# Patient Record
Sex: MALE | Race: BLACK OR AFRICAN AMERICAN | NOT HISPANIC OR LATINO | Employment: FULL TIME | URBAN - METROPOLITAN AREA
[De-identification: names, ages, dates, MRNs, and addresses within clinical notes are randomized per-mention and may not be internally consistent; named-entity substitution may affect disease eponyms.]

---

## 2017-07-03 ENCOUNTER — DOCUMENTATION ONLY (OUTPATIENT)
Dept: FAMILY MEDICINE | Facility: CLINIC | Age: 37
End: 2017-07-03

## 2017-07-03 ENCOUNTER — LAB VISIT (OUTPATIENT)
Dept: LAB | Facility: HOSPITAL | Age: 37
End: 2017-07-03
Attending: FAMILY MEDICINE
Payer: COMMERCIAL

## 2017-07-03 ENCOUNTER — OFFICE VISIT (OUTPATIENT)
Dept: FAMILY MEDICINE | Facility: CLINIC | Age: 37
End: 2017-07-03
Payer: COMMERCIAL

## 2017-07-03 VITALS
HEART RATE: 58 BPM | TEMPERATURE: 98 F | WEIGHT: 207.69 LBS | HEIGHT: 74 IN | BODY MASS INDEX: 26.66 KG/M2 | DIASTOLIC BLOOD PRESSURE: 71 MMHG | SYSTOLIC BLOOD PRESSURE: 127 MMHG

## 2017-07-03 DIAGNOSIS — Z23 NEED FOR TETANUS BOOSTER: ICD-10-CM

## 2017-07-03 DIAGNOSIS — Z13.220 SCREENING CHOLESTEROL LEVEL: ICD-10-CM

## 2017-07-03 DIAGNOSIS — Z00.00 ANNUAL PHYSICAL EXAM: ICD-10-CM

## 2017-07-03 DIAGNOSIS — Z00.00 ANNUAL PHYSICAL EXAM: Primary | ICD-10-CM

## 2017-07-03 DIAGNOSIS — Z83.3 FAMILY HISTORY OF DIABETES MELLITUS TYPE II: ICD-10-CM

## 2017-07-03 DIAGNOSIS — Z11.3 SCREEN FOR STD (SEXUALLY TRANSMITTED DISEASE): ICD-10-CM

## 2017-07-03 LAB
ALBUMIN SERPL BCP-MCNC: 4.2 G/DL
ALP SERPL-CCNC: 51 U/L
ALT SERPL W/O P-5'-P-CCNC: 22 U/L
ANION GAP SERPL CALC-SCNC: 10 MMOL/L
AST SERPL-CCNC: 21 U/L
BASOPHILS # BLD AUTO: 0.05 K/UL
BASOPHILS NFR BLD: 1.1 %
BILIRUB SERPL-MCNC: 0.4 MG/DL
BUN SERPL-MCNC: 12 MG/DL
CALCIUM SERPL-MCNC: 9.2 MG/DL
CHLORIDE SERPL-SCNC: 102 MMOL/L
CHOLEST/HDLC SERPL: 3.1 {RATIO}
CO2 SERPL-SCNC: 26 MMOL/L
CREAT SERPL-MCNC: 1.1 MG/DL
DIFFERENTIAL METHOD: ABNORMAL
EOSINOPHIL # BLD AUTO: 0.3 K/UL
EOSINOPHIL NFR BLD: 7.3 %
ERYTHROCYTE [DISTWIDTH] IN BLOOD BY AUTOMATED COUNT: 14.1 %
EST. GFR  (AFRICAN AMERICAN): >60 ML/MIN/1.73 M^2
EST. GFR  (NON AFRICAN AMERICAN): >60 ML/MIN/1.73 M^2
ESTIMATED AVG GLUCOSE: 114 MG/DL
GLUCOSE SERPL-MCNC: 95 MG/DL
HBA1C MFR BLD HPLC: 5.6 %
HCT VFR BLD AUTO: 44.4 %
HDL/CHOLESTEROL RATIO: 32.8 %
HDLC SERPL-MCNC: 180 MG/DL
HDLC SERPL-MCNC: 59 MG/DL
HGB BLD-MCNC: 14 G/DL
HIV 1+2 AB+HIV1 P24 AG SERPL QL IA: NEGATIVE
LDLC SERPL CALC-MCNC: 103.2 MG/DL
LYMPHOCYTES # BLD AUTO: 1.6 K/UL
LYMPHOCYTES NFR BLD: 37 %
MCH RBC QN AUTO: 23.5 PG
MCHC RBC AUTO-ENTMCNC: 31.5 %
MCV RBC AUTO: 75 FL
MONOCYTES # BLD AUTO: 0.5 K/UL
MONOCYTES NFR BLD: 12.3 %
NEUTROPHILS # BLD AUTO: 1.9 K/UL
NEUTROPHILS NFR BLD: 42.1 %
NONHDLC SERPL-MCNC: 121 MG/DL
PLATELET # BLD AUTO: 223 K/UL
PMV BLD AUTO: 10.2 FL
POTASSIUM SERPL-SCNC: 4.1 MMOL/L
PROT SERPL-MCNC: 7.3 G/DL
RBC # BLD AUTO: 5.95 M/UL
RPR SER QL: NORMAL
SODIUM SERPL-SCNC: 138 MMOL/L
TRIGL SERPL-MCNC: 89 MG/DL
TSH SERPL DL<=0.005 MIU/L-ACNC: 1.56 UIU/ML
WBC # BLD AUTO: 4.4 K/UL

## 2017-07-03 PROCEDURE — 86592 SYPHILIS TEST NON-TREP QUAL: CPT

## 2017-07-03 PROCEDURE — 99395 PREV VISIT EST AGE 18-39: CPT | Mod: S$GLB,,, | Performed by: PHYSICIAN ASSISTANT

## 2017-07-03 PROCEDURE — 80053 COMPREHEN METABOLIC PANEL: CPT

## 2017-07-03 PROCEDURE — 90471 IMMUNIZATION ADMIN: CPT | Mod: S$GLB,,, | Performed by: FAMILY MEDICINE

## 2017-07-03 PROCEDURE — 36415 COLL VENOUS BLD VENIPUNCTURE: CPT | Mod: PO

## 2017-07-03 PROCEDURE — 85025 COMPLETE CBC W/AUTO DIFF WBC: CPT

## 2017-07-03 PROCEDURE — 87591 N.GONORRHOEAE DNA AMP PROB: CPT

## 2017-07-03 PROCEDURE — 86703 HIV-1/HIV-2 1 RESULT ANTBDY: CPT

## 2017-07-03 PROCEDURE — 80061 LIPID PANEL: CPT

## 2017-07-03 PROCEDURE — 84443 ASSAY THYROID STIM HORMONE: CPT

## 2017-07-03 PROCEDURE — 90715 TDAP VACCINE 7 YRS/> IM: CPT | Mod: S$GLB,,, | Performed by: FAMILY MEDICINE

## 2017-07-03 PROCEDURE — 99999 PR PBB SHADOW E&M-EST. PATIENT-LVL III: CPT | Mod: PBBFAC,,, | Performed by: PHYSICIAN ASSISTANT

## 2017-07-03 PROCEDURE — 83036 HEMOGLOBIN GLYCOSYLATED A1C: CPT

## 2017-07-03 RX ORDER — MELOXICAM 15 MG/1
TABLET ORAL
COMMUNITY
Start: 2017-06-12 | End: 2017-07-03

## 2017-07-03 RX ORDER — CYCLOBENZAPRINE HCL 5 MG
TABLET ORAL
COMMUNITY
Start: 2017-06-13 | End: 2017-07-03

## 2017-07-03 NOTE — PROGRESS NOTES
Pre-Visit Chart Review  For Appointment Scheduled on 07/03/17    Health Maintenance Due   Topic Date Due    TETANUS VACCINE  06/28/1999

## 2017-07-03 NOTE — PROGRESS NOTES
Subjective:       Patient ID: Tre Sommer Sr. is a 37 y.o. male.    Chief Complaint: Annual Exam    HPI   Patient is a 37 year old AA male presenting to the clinic for annual wellness exam. He is without any complaints at this time. He reports recent episode of neck pain & had ct neck performed outside of our system. He reports pain has resolved. He would like to be screened for STDs as well.   Review of Systems   Constitutional: Negative for activity change, appetite change, chills, fatigue and fever.   HENT: Negative for congestion, ear pain, postnasal drip, rhinorrhea and sinus pressure.    Respiratory: Negative for cough, shortness of breath and wheezing.    Cardiovascular: Negative for chest pain and palpitations.   Gastrointestinal: Negative for abdominal pain, constipation, diarrhea, nausea and vomiting.   Genitourinary: Negative for frequency, hematuria and urgency.   Musculoskeletal: Negative for back pain and gait problem.   Skin: Negative for rash.   Neurological: Negative for syncope, weakness and headaches.   Psychiatric/Behavioral: Negative for agitation and confusion.       Objective:      Physical Exam   Constitutional: Vital signs are normal. He appears well-developed and well-nourished. No distress.   Cardiovascular: Normal rate, regular rhythm, S1 normal, S2 normal and normal heart sounds.  Exam reveals no gallop.    No murmur heard.  Pulses:       Radial pulses are 2+ on the right side, and 2+ on the left side.   <2sec cap refill fingers bilat     Pulmonary/Chest: Effort normal and breath sounds normal. No respiratory distress. He has no wheezes. He has no rhonchi.   Skin: Skin is warm and dry. He is not diaphoretic.   Appropriate skin turgor   Psychiatric: He has a normal mood and affect. His speech is normal and behavior is normal. Judgment and thought content normal. Cognition and memory are normal.       Assessment:       1. Annual physical exam    2. Family history of diabetes mellitus  type II    3. Screen for STD (sexually transmitted disease)    4. Need for tetanus booster    5. Screening cholesterol level        Plan:   Tre was seen today for annual exam.    Diagnoses and all orders for this visit:    Annual physical exam  -     CBC auto differential; Future  -     Comprehensive metabolic panel; Future  -     TSH; Future    Family history of diabetes mellitus type II  -     Comprehensive metabolic panel; Future  -     Hemoglobin A1c; Future    Screen for STD (sexually transmitted disease)  -     HIV-1 and HIV-2 antibodies; Future  -     RPR; Future  -     C. trachomatis/N. gonorrhoeae by AMP DNA Urine    Need for tetanus booster  -     (In Office Administered) Tdap Vaccine    Screening cholesterol level  -     Lipid panel; Future

## 2017-07-04 LAB
C TRACH DNA SPEC QL NAA+PROBE: NOT DETECTED
N GONORRHOEA DNA SPEC QL NAA+PROBE: NOT DETECTED

## 2017-07-09 ENCOUNTER — PATIENT MESSAGE (OUTPATIENT)
Dept: FAMILY MEDICINE | Facility: CLINIC | Age: 37
End: 2017-07-09

## 2018-11-12 ENCOUNTER — OFFICE VISIT (OUTPATIENT)
Dept: FAMILY MEDICINE | Facility: CLINIC | Age: 38
End: 2018-11-12
Payer: COMMERCIAL

## 2018-11-12 VITALS
DIASTOLIC BLOOD PRESSURE: 65 MMHG | SYSTOLIC BLOOD PRESSURE: 110 MMHG | HEIGHT: 74 IN | TEMPERATURE: 98 F | WEIGHT: 214.31 LBS | BODY MASS INDEX: 27.5 KG/M2 | HEART RATE: 70 BPM

## 2018-11-12 DIAGNOSIS — Z13.220 SCREENING CHOLESTEROL LEVEL: ICD-10-CM

## 2018-11-12 DIAGNOSIS — Z23 NEEDS FLU SHOT: ICD-10-CM

## 2018-11-12 DIAGNOSIS — Z11.3 SCREEN FOR STD (SEXUALLY TRANSMITTED DISEASE): ICD-10-CM

## 2018-11-12 DIAGNOSIS — Z00.00 ANNUAL PHYSICAL EXAM: Primary | ICD-10-CM

## 2018-11-12 PROCEDURE — 90471 IMMUNIZATION ADMIN: CPT | Mod: S$GLB,,, | Performed by: PHYSICIAN ASSISTANT

## 2018-11-12 PROCEDURE — 99999 PR PBB SHADOW E&M-EST. PATIENT-LVL III: CPT | Mod: PBBFAC,,, | Performed by: PHYSICIAN ASSISTANT

## 2018-11-12 PROCEDURE — 90686 IIV4 VACC NO PRSV 0.5 ML IM: CPT | Mod: S$GLB,,, | Performed by: PHYSICIAN ASSISTANT

## 2018-11-12 PROCEDURE — 99395 PREV VISIT EST AGE 18-39: CPT | Mod: 25,S$GLB,, | Performed by: PHYSICIAN ASSISTANT

## 2018-11-12 NOTE — PROGRESS NOTES
Subjective:       Patient ID: Tre Sommer Sr. is a 38 y.o. male.    Chief Complaint: Annual Exam    HPI   Patient is a 38 year old AA male presenting to the clinic for annual physical exam. Patient is doing well at this time. He denies any recent hospitalizations or new diagnosis. He would like to return for fasting labs & get screened for STDs. He denies any current symptoms. Patient works out at the gym frequently & has been feeling well. He agrees to get his flu vaccination today.  Review of Systems   Constitutional: Negative for activity change, appetite change, chills, fatigue and fever.   HENT: Negative for congestion, ear pain, postnasal drip, rhinorrhea and sinus pressure.    Respiratory: Negative for cough, shortness of breath and wheezing.    Cardiovascular: Negative for chest pain and palpitations.   Gastrointestinal: Negative for abdominal pain, constipation, diarrhea, nausea and vomiting.   Genitourinary: Negative for frequency, hematuria and urgency.   Musculoskeletal: Negative for back pain and gait problem.   Skin: Negative for rash.   Neurological: Negative for syncope, weakness and headaches.   Psychiatric/Behavioral: Negative for agitation and confusion.       Objective:      Physical Exam   Constitutional: Vital signs are normal. He appears well-developed and well-nourished. No distress.   Cardiovascular: Normal rate, regular rhythm, S1 normal, S2 normal and normal heart sounds. Exam reveals no gallop.   No murmur heard.  Pulses:       Radial pulses are 2+ on the right side, and 2+ on the left side.   <2sec cap refill fingers bilat     Pulmonary/Chest: Effort normal and breath sounds normal. No respiratory distress. He has no wheezes. He has no rhonchi.   Abdominal: Soft. Normal appearance and bowel sounds are normal. There is no hepatosplenomegaly. There is no tenderness. There is no rigidity, no guarding, no tenderness at McBurney's point and negative Benson's sign.   Skin: Skin is warm and  dry. He is not diaphoretic.   Appropriate skin turgor   Psychiatric: He has a normal mood and affect. His speech is normal and behavior is normal. Judgment and thought content normal. Cognition and memory are normal.       Assessment:       1. Annual physical exam    2. Screen for STD (sexually transmitted disease)    3. Screening cholesterol level    4. Needs flu shot        Plan:       Tre was seen today for annual exam.    Diagnoses and all orders for this visit:    Annual physical exam  -     CBC auto differential; Future  -     Comprehensive metabolic panel; Future  -     Lipid panel; Future  -     TSH; Future    Screen for STD (sexually transmitted disease)  -     RPR; Future  -     HIV-1 and HIV-2 antibodies; Future  -     C. trachomatis/N. gonorrhoeae by AMP DNA [facility]; Urine    Screening cholesterol level  -     Lipid panel; Future    Needs flu shot  -     Influenza - Quadrivalent (3 years & older) (PF)

## 2018-11-17 ENCOUNTER — LAB VISIT (OUTPATIENT)
Dept: LAB | Facility: HOSPITAL | Age: 38
End: 2018-11-17
Attending: PHYSICIAN ASSISTANT
Payer: COMMERCIAL

## 2018-11-17 DIAGNOSIS — Z11.3 SCREEN FOR STD (SEXUALLY TRANSMITTED DISEASE): ICD-10-CM

## 2018-11-17 DIAGNOSIS — Z13.220 SCREENING CHOLESTEROL LEVEL: ICD-10-CM

## 2018-11-17 DIAGNOSIS — Z00.00 ANNUAL PHYSICAL EXAM: ICD-10-CM

## 2018-11-17 LAB
ALBUMIN SERPL BCP-MCNC: 4 G/DL
ALP SERPL-CCNC: 57 U/L
ALT SERPL W/O P-5'-P-CCNC: 23 U/L
ANION GAP SERPL CALC-SCNC: 7 MMOL/L
AST SERPL-CCNC: 24 U/L
BASOPHILS # BLD AUTO: 0.07 K/UL
BASOPHILS NFR BLD: 1.7 %
BILIRUB SERPL-MCNC: 0.7 MG/DL
BUN SERPL-MCNC: 11 MG/DL
CALCIUM SERPL-MCNC: 9.1 MG/DL
CHLORIDE SERPL-SCNC: 104 MMOL/L
CHOLEST SERPL-MCNC: 190 MG/DL
CHOLEST/HDLC SERPL: 2.5 {RATIO}
CO2 SERPL-SCNC: 28 MMOL/L
CREAT SERPL-MCNC: 1.1 MG/DL
DIFFERENTIAL METHOD: ABNORMAL
EOSINOPHIL # BLD AUTO: 0.2 K/UL
EOSINOPHIL NFR BLD: 5.2 %
ERYTHROCYTE [DISTWIDTH] IN BLOOD BY AUTOMATED COUNT: 15.1 %
EST. GFR  (AFRICAN AMERICAN): >60 ML/MIN/1.73 M^2
EST. GFR  (NON AFRICAN AMERICAN): >60 ML/MIN/1.73 M^2
GLUCOSE SERPL-MCNC: 97 MG/DL
HCT VFR BLD AUTO: 45.9 %
HDLC SERPL-MCNC: 77 MG/DL
HDLC SERPL: 40.5 %
HGB BLD-MCNC: 13.9 G/DL
IMM GRANULOCYTES # BLD AUTO: 0.01 K/UL
IMM GRANULOCYTES NFR BLD AUTO: 0.2 %
LDLC SERPL CALC-MCNC: 99 MG/DL
LYMPHOCYTES # BLD AUTO: 1.6 K/UL
LYMPHOCYTES NFR BLD: 38.7 %
MCH RBC QN AUTO: 23.7 PG
MCHC RBC AUTO-ENTMCNC: 30.3 G/DL
MCV RBC AUTO: 78 FL
MONOCYTES # BLD AUTO: 0.5 K/UL
MONOCYTES NFR BLD: 12.2 %
NEUTROPHILS # BLD AUTO: 1.7 K/UL
NEUTROPHILS NFR BLD: 42 %
NONHDLC SERPL-MCNC: 113 MG/DL
NRBC BLD-RTO: 0 /100 WBC
PLATELET # BLD AUTO: 302 K/UL
PMV BLD AUTO: 10.6 FL
POTASSIUM SERPL-SCNC: 4.3 MMOL/L
PROT SERPL-MCNC: 7.1 G/DL
RBC # BLD AUTO: 5.86 M/UL
SODIUM SERPL-SCNC: 139 MMOL/L
TRIGL SERPL-MCNC: 70 MG/DL
TSH SERPL DL<=0.005 MIU/L-ACNC: 1 UIU/ML
WBC # BLD AUTO: 4.03 K/UL

## 2018-11-17 PROCEDURE — 80053 COMPREHEN METABOLIC PANEL: CPT

## 2018-11-17 PROCEDURE — 80061 LIPID PANEL: CPT

## 2018-11-17 PROCEDURE — 84443 ASSAY THYROID STIM HORMONE: CPT

## 2018-11-17 PROCEDURE — 36415 COLL VENOUS BLD VENIPUNCTURE: CPT | Mod: PO

## 2018-11-17 PROCEDURE — 86592 SYPHILIS TEST NON-TREP QUAL: CPT

## 2018-11-17 PROCEDURE — 85025 COMPLETE CBC W/AUTO DIFF WBC: CPT

## 2018-11-17 PROCEDURE — 86703 HIV-1/HIV-2 1 RESULT ANTBDY: CPT

## 2018-11-19 LAB
HIV 1+2 AB+HIV1 P24 AG SERPL QL IA: NEGATIVE
RPR SER QL: NORMAL

## 2019-09-08 ENCOUNTER — OFFICE VISIT (OUTPATIENT)
Dept: URGENT CARE | Facility: CLINIC | Age: 39
End: 2019-09-08
Payer: COMMERCIAL

## 2019-09-08 VITALS
RESPIRATION RATE: 18 BRPM | TEMPERATURE: 98 F | WEIGHT: 208 LBS | BODY MASS INDEX: 26.69 KG/M2 | HEART RATE: 58 BPM | DIASTOLIC BLOOD PRESSURE: 71 MMHG | HEIGHT: 74 IN | OXYGEN SATURATION: 98 % | SYSTOLIC BLOOD PRESSURE: 118 MMHG

## 2019-09-08 DIAGNOSIS — J06.9 VIRAL URI: Primary | ICD-10-CM

## 2019-09-08 LAB
CTP QC/QA: YES
S PYO RRNA THROAT QL PROBE: NEGATIVE

## 2019-09-08 PROCEDURE — 87880 STREP A ASSAY W/OPTIC: CPT | Mod: QW,S$GLB,, | Performed by: FAMILY MEDICINE

## 2019-09-08 PROCEDURE — 3008F PR BODY MASS INDEX (BMI) DOCUMENTED: ICD-10-PCS | Mod: CPTII,S$GLB,, | Performed by: FAMILY MEDICINE

## 2019-09-08 PROCEDURE — 99214 PR OFFICE/OUTPT VISIT, EST, LEVL IV, 30-39 MIN: ICD-10-PCS | Mod: S$GLB,,, | Performed by: FAMILY MEDICINE

## 2019-09-08 PROCEDURE — 87880 POCT RAPID STREP A: ICD-10-PCS | Mod: QW,S$GLB,, | Performed by: FAMILY MEDICINE

## 2019-09-08 PROCEDURE — 3008F BODY MASS INDEX DOCD: CPT | Mod: CPTII,S$GLB,, | Performed by: FAMILY MEDICINE

## 2019-09-08 PROCEDURE — 99214 OFFICE O/P EST MOD 30 MIN: CPT | Mod: S$GLB,,, | Performed by: FAMILY MEDICINE

## 2019-09-08 RX ORDER — FLUTICASONE PROPIONATE 50 MCG
1 SPRAY, SUSPENSION (ML) NASAL 2 TIMES DAILY
Qty: 1 BOTTLE | Refills: 0 | Status: SHIPPED | OUTPATIENT
Start: 2019-09-08 | End: 2021-02-09

## 2019-09-08 NOTE — PATIENT INSTRUCTIONS
PLEASE READ YOUR DISCHARGE INSTRUCTIONS ENTIRELY AS IT CONTAINS IMPORTANT INFORMATION.      Please drink plenty of fluids.    Please get plenty of rest.    Please return here or go to the Emergency Department for any concerns or worsening of condition.    Continue claritin D    If not allergic, please take over the counter Tylenol (Acetaminophen) and/or Motrin (Ibuprofen) as directed for control of pain and/or fever.  Please follow up with your primary care doctor or specialist as needed.    Sore throat recommendations: Warm fluids, warm salt water gargles, throat lozenges, tea, honey, soup, rest, hydration.    Use over the counter flonase: one spray each nostril twice daily OR two sprays each nostril once daily.     If you  smoke, please stop smoking.      Please return or see your primary care doctor if you develop new or worsening symptoms.     Please arrange follow up with your primary medical clinic as soon as possible. You must understand that you've received an Urgent Care treatment only and that you may be released before all of your medical problems are known or treated. You, the patient, will arrange for follow up as instructed. If your symptoms worsen or fail to improve you should go to the Emergency Room.    Viral Upper Respiratory Illness (Adult)  You have a viral upper respiratory illness (URI), which is another term for the common cold. This illness is contagious during the first few days. It is spread through the air by coughing and sneezing. It may also be spread by direct contact (touching the sick person and then touching your own eyes, nose, or mouth). Frequent handwashing will decrease risk of spread. Most viral illnesses go away within 7 to 10 days with rest and simple home remedies. Sometimes the illness may last for several weeks. Antibiotics will not kill a virus, and they are generally not prescribed for this condition.    Home care  · If symptoms are severe, rest at home for the first 2 to  3 days. When you resume activity, don't let yourself get too tired.  · Avoid being exposed to cigarette smoke (yours or others).  · You may use acetaminophen or ibuprofen to control pain and fever, unless another medicine was prescribed. (Note: If you have chronic liver or kidney disease, have ever had a stomach ulcer or gastrointestinal bleeding, or are taking blood-thinning medicines, talk with your healthcare provider before using these medicines.) Aspirin should never be given to anyone under 18 years of age who is ill with a viral infection or fever. It may cause severe liver or brain damage.  · Your appetite may be poor, so a light diet is fine. Avoid dehydration by drinking 6 to 8 glasses of fluids per day (water, soft drinks, juices, tea, or soup). Extra fluids will help loosen secretions in the nose and lungs.  · Over-the-counter cold medicines will not shorten the length of time youre sick, but they may be helpful for the following symptoms: cough, sore throat, and nasal and sinus congestion. (Note: Do not use decongestants if you have high blood pressure.)  Follow-up care  Follow up with your healthcare provider, or as advised.  When to seek medical advice  Call your healthcare provider right away if any of these occur:  · Cough with lots of colored sputum (mucus)  · Severe headache; face, neck, or ear pain  · Difficulty swallowing due to throat pain  · Fever of 100.4°F (38°C)  Call 911, or get immediate medical care  Call emergency services right away if any of these occur:  · Chest pain, shortness of breath, wheezing, or difficulty breathing  · Coughing up blood  · Inability to swallow due to throat pain  Date Last Reviewed: 9/13/2015  © 7677-1628 [employer]. [address] [place] [address], [place] [address]. All rights reserved. This information is not intended as a substitute for professional medical care. Always follow your healthcare professional's instructions.

## 2019-09-08 NOTE — PROGRESS NOTES
"Subjective:       Patient ID: Tre Sommer Sr. is a 39 y.o. male.    Vitals:  height is 6' 2" (1.88 m) and weight is 94.3 kg (208 lb). His temperature is 97.7 °F (36.5 °C). His blood pressure is 118/71 and his pulse is 58 (abnormal). His respiration is 18 and oxygen saturation is 98%.     Chief Complaint: Sore Throat and Fever    Sore throat started a week ago sometimes cough.  Temperature highest 99.  Symptoms not worsening.  Took Claritin-D once    Sore Throat    This is a new problem. The current episode started 1 to 4 weeks ago. The problem has been unchanged. The maximum temperature recorded prior to his arrival was 100.4 - 100.9 F. Associated symptoms include coughing (infrequent) and ear pain. Pertinent negatives include no congestion, shortness of breath, stridor or vomiting. He has tried NSAIDs (claritin d, motrin) for the symptoms. The treatment provided no relief.   Fever    Associated symptoms include coughing (infrequent), ear pain, nausea and a sore throat. Pertinent negatives include no congestion, rash, vomiting or wheezing.       Constitution: Negative for chills, sweating, fatigue and fever.   HENT: Positive for ear pain and sore throat. Negative for congestion, sinus pain, sinus pressure and voice change.    Neck: Negative for painful lymph nodes.   Eyes: Negative for eye redness.   Respiratory: Positive for cough (infrequent). Negative for chest tightness, sputum production, bloody sputum, COPD, shortness of breath, stridor, wheezing and asthma.    Gastrointestinal: Positive for nausea. Negative for vomiting.   Musculoskeletal: Negative for muscle ache.   Skin: Negative for rash.   Allergic/Immunologic: Negative for seasonal allergies and asthma.   Hematologic/Lymphatic: Negative for swollen lymph nodes.       Objective:      Physical Exam   Constitutional: He is oriented to person, place, and time. He appears well-developed and well-nourished. He is cooperative.  Non-toxic appearance. He does " not appear ill. No distress.   HENT:   Head: Normocephalic and atraumatic.   Right Ear: Hearing, tympanic membrane, external ear and ear canal normal. Tympanic membrane is not erythematous. No middle ear effusion.   Left Ear: Hearing, tympanic membrane, external ear and ear canal normal. Tympanic membrane is not erythematous.  No middle ear effusion.   Nose: Mucosal edema present. No rhinorrhea or nasal deformity. No epistaxis. Right sinus exhibits no maxillary sinus tenderness and no frontal sinus tenderness. Left sinus exhibits no maxillary sinus tenderness and no frontal sinus tenderness.   Mouth/Throat: Uvula is midline, oropharynx is clear and moist and mucous membranes are normal. No trismus in the jaw. Normal dentition. No uvula swelling. No posterior oropharyngeal erythema.   Cobblestoning   Eyes: Conjunctivae and lids are normal. No scleral icterus.   Sclera clear bilat   Neck: Trachea normal, full passive range of motion without pain and phonation normal. Neck supple.   Cardiovascular: Normal rate, regular rhythm, normal heart sounds, intact distal pulses and normal pulses.   Pulmonary/Chest: Effort normal and breath sounds normal. No respiratory distress. He has no wheezes.   Abdominal: Soft. Normal appearance and bowel sounds are normal. He exhibits no distension. There is no tenderness.   Musculoskeletal: Normal range of motion. He exhibits no edema or deformity.   Lymphadenopathy:     He has no cervical adenopathy.   Neurological: He is alert and oriented to person, place, and time. He exhibits normal muscle tone. Coordination normal.   Skin: Skin is warm, dry and intact. He is not diaphoretic. No pallor.   Psychiatric: He has a normal mood and affect. His speech is normal and behavior is normal. Judgment and thought content normal. Cognition and memory are normal.   Nursing note and vitals reviewed.      Results for orders placed or performed in visit on 09/08/19   POCT rapid strep A   Result Value  Ref Range    Rapid Strep A Screen Negative Negative     Acceptable Yes        Assessment:       1. Viral URI        Plan:         Viral URI  -     POCT rapid strep A  -     fluticasone propionate (FLONASE) 50 mcg/actuation nasal spray; 1 spray (50 mcg total) by Each Nostril route 2 (two) times daily.  Dispense: 1 Bottle; Refill: 0      Patient Instructions   PLEASE READ YOUR DISCHARGE INSTRUCTIONS ENTIRELY AS IT CONTAINS IMPORTANT INFORMATION.      Please drink plenty of fluids.    Please get plenty of rest.    Please return here or go to the Emergency Department for any concerns or worsening of condition.    Continue claritin D    If not allergic, please take over the counter Tylenol (Acetaminophen) and/or Motrin (Ibuprofen) as directed for control of pain and/or fever.  Please follow up with your primary care doctor or specialist as needed.    Sore throat recommendations: Warm fluids, warm salt water gargles, throat lozenges, tea, honey, soup, rest, hydration.    Use over the counter flonase: one spray each nostril twice daily OR two sprays each nostril once daily.     If you  smoke, please stop smoking.      Please return or see your primary care doctor if you develop new or worsening symptoms.     Please arrange follow up with your primary medical clinic as soon as possible. You must understand that you've received an Urgent Care treatment only and that you may be released before all of your medical problems are known or treated. You, the patient, will arrange for follow up as instructed. If your symptoms worsen or fail to improve you should go to the Emergency Room.    Viral Upper Respiratory Illness (Adult)  You have a viral upper respiratory illness (URI), which is another term for the common cold. This illness is contagious during the first few days. It is spread through the air by coughing and sneezing. It may also be spread by direct contact (touching the sick person and then touching your  own eyes, nose, or mouth). Frequent handwashing will decrease risk of spread. Most viral illnesses go away within 7 to 10 days with rest and simple home remedies. Sometimes the illness may last for several weeks. Antibiotics will not kill a virus, and they are generally not prescribed for this condition.    Home care  · If symptoms are severe, rest at home for the first 2 to 3 days. When you resume activity, don't let yourself get too tired.  · Avoid being exposed to cigarette smoke (yours or others).  · You may use acetaminophen or ibuprofen to control pain and fever, unless another medicine was prescribed. (Note: If you have chronic liver or kidney disease, have ever had a stomach ulcer or gastrointestinal bleeding, or are taking blood-thinning medicines, talk with your healthcare provider before using these medicines.) Aspirin should never be given to anyone under 18 years of age who is ill with a viral infection or fever. It may cause severe liver or brain damage.  · Your appetite may be poor, so a light diet is fine. Avoid dehydration by drinking 6 to 8 glasses of fluids per day (water, soft drinks, juices, tea, or soup). Extra fluids will help loosen secretions in the nose and lungs.  · Over-the-counter cold medicines will not shorten the length of time youre sick, but they may be helpful for the following symptoms: cough, sore throat, and nasal and sinus congestion. (Note: Do not use decongestants if you have high blood pressure.)  Follow-up care  Follow up with your healthcare provider, or as advised.  When to seek medical advice  Call your healthcare provider right away if any of these occur:  · Cough with lots of colored sputum (mucus)  · Severe headache; face, neck, or ear pain  · Difficulty swallowing due to throat pain  · Fever of 100.4°F (38°C)  Call 911, or get immediate medical care  Call emergency services right away if any of these occur:  · Chest pain, shortness of breath, wheezing, or  difficulty breathing  · Coughing up blood  · Inability to swallow due to throat pain  Date Last Reviewed: 9/13/2015  © 9299-8064 [employer]. [address] [place] [address], [place] [address]. All rights reserved. This information is not intended as a substitute for professional medical care. Always follow your healthcare professional's instructions.

## 2019-10-28 ENCOUNTER — PATIENT OUTREACH (OUTPATIENT)
Dept: ADMINISTRATIVE | Facility: HOSPITAL | Age: 39
End: 2019-10-28

## 2019-11-10 NOTE — PROGRESS NOTES
"Subjective:     @Patient ID: Tre Sommer Sr. is a 39 y.o. male.    Chief Complaint: Establish Care and Annual Exam    HPI    39 y.o. male here for annual exam. Pt is new to me. Is  with 2 kids. Works for [employer]. Works out regularly. Reports he is well.     Lipid disorders/ASCVD risk (ages >/= 45 or >/= 20 if increased risk ): ordered  DM (>45y yearly or if obese, HTN): A1c ordered  Eye exam: n/a      Vaccines:   Influenza (yearly) due   Tetanus (every 10 yrs - 1st tdap) done 2017    Exercise: 5-6x/week crossfit and jogging 2-3x a week   Diet: Regular         Review of Systems   Constitutional: Negative for activity change and unexpected weight change.   HENT: Negative for hearing loss, rhinorrhea and trouble swallowing.    Eyes: Negative for discharge and visual disturbance.   Respiratory: Negative for chest tightness and wheezing.    Cardiovascular: Negative for chest pain and palpitations.   Gastrointestinal: Negative for blood in stool, constipation, diarrhea and vomiting.   Endocrine: Negative for polydipsia and polyuria.   Genitourinary: Negative for difficulty urinating, hematuria and urgency.   Musculoskeletal: Negative for arthralgias, joint swelling and neck pain.   Neurological: Negative for weakness and headaches.   Psychiatric/Behavioral: Negative for confusion and dysphoric mood.     Past medical history, surgical history, and family medical history reviewed and updated as appropriate.    Medications and allergies reviewed.     Objective:     Vitals:    11/11/19 0805   BP: 96/64   BP Location: Right arm   Patient Position: Sitting   BP Method: Large (Manual)   Pulse: 60   Resp: 14   Temp: 99.1 °F (37.3 °C)   TempSrc: Oral   Weight: 95.1 kg (209 lb 10.5 oz)   Height: 6' 2.5" (1.892 m)     Body mass index is 26.56 kg/m².  Physical Exam   Constitutional: He is oriented to person, place, and time. He appears well-developed. No distress.   HENT:   Head: Normocephalic and atraumatic. "   Right Ear: External ear normal.   Left Ear: External ear normal.   Mouth/Throat: Oropharynx is clear and moist. No oropharyngeal exudate.   Eyes: Conjunctivae are normal. Right eye exhibits no discharge. Left eye exhibits no discharge.   Neck: Normal range of motion. Neck supple.   Cardiovascular: Normal rate, regular rhythm and intact distal pulses. Exam reveals no friction rub.   No murmur heard.  Pulmonary/Chest: Effort normal and breath sounds normal.   Abdominal: Soft. Bowel sounds are normal. He exhibits no distension. There is no tenderness.   Musculoskeletal: Normal range of motion. He exhibits no edema.   Lymphadenopathy:     He has no cervical adenopathy.   Neurological: He is alert and oriented to person, place, and time.   Skin: Skin is warm and dry.   Psychiatric: He has a normal mood and affect. His behavior is normal.   Vitals reviewed.      Lab Results   Component Value Date    WBC 4.03 11/17/2018    HGB 13.9 (L) 11/17/2018    HCT 45.9 11/17/2018     11/17/2018    CHOL 190 11/17/2018    TRIG 70 11/17/2018    HDL 77 (H) 11/17/2018    ALT 23 11/17/2018    AST 24 11/17/2018     11/17/2018    K 4.3 11/17/2018     11/17/2018    CREATININE 1.1 11/17/2018    BUN 11 11/17/2018    CO2 28 11/17/2018    TSH 0.997 11/17/2018    HGBA1C 5.6 07/03/2017       Assessment:     1. Annual physical exam    2. Screen for STD (sexually transmitted disease)      Plan:   Tre was seen today for establish care and annual exam.    Diagnoses and all orders for this visit:    Annual physical exam  -     Comprehensive metabolic panel; Future  -     CBC auto differential; Future  -     TSH; Future  -     Vitamin D; Future  -     Lipid panel; Future  -     Urinalysis; Future  -     HEMOGLOBIN A1C; Future  -     RPR; Future  -     C. trachomatis/N. gonorrhoeae by AMP DNA [facility]; Urine; Future  -     HIV 1/2 Ag/Ab (4th Gen); Future  -     Hepatitis panel, acute; Future  -     Testosterone; Future    Screen for  STD (sexually transmitted disease)  -     RPR; Future  -     C. trachomatis/N. gonorrhoeae by AMP DNA [facility]; Urine; Future  -     HIV 1/2 Ag/Ab (4th Gen); Future  -     Hepatitis panel, acute; Future        Follow up in about 1 year (around 11/11/2020), or if symptoms worsen or fail to improve.    Kylie Henry MD  Internal Medicine    11/10/2019

## 2019-11-11 ENCOUNTER — OFFICE VISIT (OUTPATIENT)
Dept: INTERNAL MEDICINE | Facility: CLINIC | Age: 39
End: 2019-11-11
Payer: COMMERCIAL

## 2019-11-11 ENCOUNTER — TELEPHONE (OUTPATIENT)
Dept: INTERNAL MEDICINE | Facility: CLINIC | Age: 39
End: 2019-11-11

## 2019-11-11 ENCOUNTER — LAB VISIT (OUTPATIENT)
Dept: LAB | Facility: HOSPITAL | Age: 39
End: 2019-11-11
Attending: HOSPITALIST
Payer: COMMERCIAL

## 2019-11-11 VITALS
SYSTOLIC BLOOD PRESSURE: 96 MMHG | WEIGHT: 209.69 LBS | HEIGHT: 75 IN | HEART RATE: 60 BPM | BODY MASS INDEX: 26.07 KG/M2 | RESPIRATION RATE: 14 BRPM | DIASTOLIC BLOOD PRESSURE: 64 MMHG | TEMPERATURE: 99 F

## 2019-11-11 DIAGNOSIS — D50.9 MICROCYTIC ANEMIA: Primary | ICD-10-CM

## 2019-11-11 DIAGNOSIS — Z11.3 SCREEN FOR STD (SEXUALLY TRANSMITTED DISEASE): ICD-10-CM

## 2019-11-11 DIAGNOSIS — Z00.00 ANNUAL PHYSICAL EXAM: Primary | ICD-10-CM

## 2019-11-11 DIAGNOSIS — Z00.00 ANNUAL PHYSICAL EXAM: ICD-10-CM

## 2019-11-11 LAB
25(OH)D3+25(OH)D2 SERPL-MCNC: 24 NG/ML (ref 30–96)
ALBUMIN SERPL BCP-MCNC: 4.3 G/DL (ref 3.5–5.2)
ALP SERPL-CCNC: 47 U/L (ref 55–135)
ALT SERPL W/O P-5'-P-CCNC: 20 U/L (ref 10–44)
ANION GAP SERPL CALC-SCNC: 9 MMOL/L (ref 8–16)
AST SERPL-CCNC: 30 U/L (ref 10–40)
BASOPHILS # BLD AUTO: 0.07 K/UL (ref 0–0.2)
BASOPHILS NFR BLD: 1.4 % (ref 0–1.9)
BILIRUB SERPL-MCNC: 0.6 MG/DL (ref 0.1–1)
BUN SERPL-MCNC: 10 MG/DL (ref 6–20)
CALCIUM SERPL-MCNC: 9.7 MG/DL (ref 8.7–10.5)
CHLORIDE SERPL-SCNC: 107 MMOL/L (ref 95–110)
CHOLEST SERPL-MCNC: 197 MG/DL (ref 120–199)
CHOLEST/HDLC SERPL: 2.6 {RATIO} (ref 2–5)
CO2 SERPL-SCNC: 25 MMOL/L (ref 23–29)
CREAT SERPL-MCNC: 1.1 MG/DL (ref 0.5–1.4)
DIFFERENTIAL METHOD: ABNORMAL
EOSINOPHIL # BLD AUTO: 0.1 K/UL (ref 0–0.5)
EOSINOPHIL NFR BLD: 2 % (ref 0–8)
ERYTHROCYTE [DISTWIDTH] IN BLOOD BY AUTOMATED COUNT: 14.5 % (ref 11.5–14.5)
EST. GFR  (AFRICAN AMERICAN): >60 ML/MIN/1.73 M^2
EST. GFR  (NON AFRICAN AMERICAN): >60 ML/MIN/1.73 M^2
ESTIMATED AVG GLUCOSE: 108 MG/DL (ref 68–131)
FERRITIN SERPL-MCNC: 94 NG/ML (ref 20–300)
GLUCOSE SERPL-MCNC: 85 MG/DL (ref 70–110)
HAV IGM SERPL QL IA: NEGATIVE
HBA1C MFR BLD HPLC: 5.4 % (ref 4–5.6)
HBV CORE IGM SERPL QL IA: NEGATIVE
HBV SURFACE AG SERPL QL IA: NEGATIVE
HCT VFR BLD AUTO: 43.4 % (ref 40–54)
HCV AB SERPL QL IA: NEGATIVE
HDLC SERPL-MCNC: 76 MG/DL (ref 40–75)
HDLC SERPL: 38.6 % (ref 20–50)
HGB BLD-MCNC: 13 G/DL (ref 14–18)
HIV 1+2 AB+HIV1 P24 AG SERPL QL IA: NEGATIVE
IMM GRANULOCYTES # BLD AUTO: 0.01 K/UL (ref 0–0.04)
IMM GRANULOCYTES NFR BLD AUTO: 0.2 % (ref 0–0.5)
IRON SERPL-MCNC: 83 UG/DL (ref 45–160)
LDLC SERPL CALC-MCNC: 106 MG/DL (ref 63–159)
LYMPHOCYTES # BLD AUTO: 1.4 K/UL (ref 1–4.8)
LYMPHOCYTES NFR BLD: 28 % (ref 18–48)
MCH RBC QN AUTO: 23.5 PG (ref 27–31)
MCHC RBC AUTO-ENTMCNC: 30 G/DL (ref 32–36)
MCV RBC AUTO: 79 FL (ref 82–98)
MONOCYTES # BLD AUTO: 0.5 K/UL (ref 0.3–1)
MONOCYTES NFR BLD: 10.7 % (ref 4–15)
NEUTROPHILS # BLD AUTO: 2.9 K/UL (ref 1.8–7.7)
NEUTROPHILS NFR BLD: 57.7 % (ref 38–73)
NONHDLC SERPL-MCNC: 121 MG/DL
NRBC BLD-RTO: 0 /100 WBC
PLATELET # BLD AUTO: 262 K/UL (ref 150–350)
PMV BLD AUTO: 9.9 FL (ref 9.2–12.9)
POTASSIUM SERPL-SCNC: 4.7 MMOL/L (ref 3.5–5.1)
PROT SERPL-MCNC: 7.3 G/DL (ref 6–8.4)
RBC # BLD AUTO: 5.53 M/UL (ref 4.6–6.2)
SATURATED IRON: 22 % (ref 20–50)
SODIUM SERPL-SCNC: 141 MMOL/L (ref 136–145)
TESTOST SERPL-MCNC: 685 NG/DL (ref 304–1227)
TOTAL IRON BINDING CAPACITY: 386 UG/DL (ref 250–450)
TRANSFERRIN SERPL-MCNC: 261 MG/DL (ref 200–375)
TRIGL SERPL-MCNC: 75 MG/DL (ref 30–150)
TSH SERPL DL<=0.005 MIU/L-ACNC: 1.44 UIU/ML (ref 0.4–4)
WBC # BLD AUTO: 5.07 K/UL (ref 3.9–12.7)

## 2019-11-11 PROCEDURE — 83036 HEMOGLOBIN GLYCOSYLATED A1C: CPT

## 2019-11-11 PROCEDURE — 36415 COLL VENOUS BLD VENIPUNCTURE: CPT | Mod: PO

## 2019-11-11 PROCEDURE — 90471 IMMUNIZATION ADMIN: CPT | Mod: S$GLB,,, | Performed by: HOSPITALIST

## 2019-11-11 PROCEDURE — 86703 HIV-1/HIV-2 1 RESULT ANTBDY: CPT

## 2019-11-11 PROCEDURE — 90686 FLU VACCINE (QUAD) GREATER THAN OR EQUAL TO 3YO PRESERVATIVE FREE IM: ICD-10-PCS | Mod: S$GLB,,, | Performed by: HOSPITALIST

## 2019-11-11 PROCEDURE — 86592 SYPHILIS TEST NON-TREP QUAL: CPT

## 2019-11-11 PROCEDURE — 99999 PR PBB SHADOW E&M-EST. PATIENT-LVL III: CPT | Mod: PBBFAC,,, | Performed by: HOSPITALIST

## 2019-11-11 PROCEDURE — 80061 LIPID PANEL: CPT

## 2019-11-11 PROCEDURE — 80074 ACUTE HEPATITIS PANEL: CPT

## 2019-11-11 PROCEDURE — 90686 IIV4 VACC NO PRSV 0.5 ML IM: CPT | Mod: S$GLB,,, | Performed by: HOSPITALIST

## 2019-11-11 PROCEDURE — 84403 ASSAY OF TOTAL TESTOSTERONE: CPT

## 2019-11-11 PROCEDURE — 90471 FLU VACCINE (QUAD) GREATER THAN OR EQUAL TO 3YO PRESERVATIVE FREE IM: ICD-10-PCS | Mod: S$GLB,,, | Performed by: HOSPITALIST

## 2019-11-11 PROCEDURE — 80053 COMPREHEN METABOLIC PANEL: CPT

## 2019-11-11 PROCEDURE — 82306 VITAMIN D 25 HYDROXY: CPT

## 2019-11-11 PROCEDURE — 82728 ASSAY OF FERRITIN: CPT

## 2019-11-11 PROCEDURE — 85025 COMPLETE CBC W/AUTO DIFF WBC: CPT

## 2019-11-11 PROCEDURE — 99395 PREV VISIT EST AGE 18-39: CPT | Mod: 25,S$GLB,, | Performed by: HOSPITALIST

## 2019-11-11 PROCEDURE — 84443 ASSAY THYROID STIM HORMONE: CPT

## 2019-11-11 PROCEDURE — 99395 PR PREVENTIVE VISIT,EST,18-39: ICD-10-PCS | Mod: 25,S$GLB,, | Performed by: HOSPITALIST

## 2019-11-11 PROCEDURE — 99999 PR PBB SHADOW E&M-EST. PATIENT-LVL III: ICD-10-PCS | Mod: PBBFAC,,, | Performed by: HOSPITALIST

## 2019-11-11 PROCEDURE — 83540 ASSAY OF IRON: CPT

## 2019-11-12 LAB — RPR SER QL: NORMAL

## 2020-02-17 ENCOUNTER — TELEPHONE (OUTPATIENT)
Dept: UROLOGY | Facility: CLINIC | Age: 40
End: 2020-02-17

## 2020-02-17 NOTE — TELEPHONE ENCOUNTER
----- Message from Juan Arias sent at 2/17/2020  2:30 PM CST -----  Contact: pt   Type: Needs Medical Advice    Who Called: pt   Symptoms (please be specific):    How long has patient had these symptoms:    Pharmacy name and phone #:    Best Call Back Number: [phone]   Additional Information: need to submit a sample just make sure the Vasectomy is still active

## 2020-02-20 ENCOUNTER — TELEPHONE (OUTPATIENT)
Dept: UROLOGY | Facility: CLINIC | Age: 40
End: 2020-02-20

## 2020-02-20 DIAGNOSIS — Z98.52 STATUS POST VASECTOMY: Primary | ICD-10-CM

## 2020-03-11 ENCOUNTER — TELEPHONE (OUTPATIENT)
Dept: UROLOGY | Facility: CLINIC | Age: 40
End: 2020-03-11

## 2020-03-11 NOTE — TELEPHONE ENCOUNTER
Called patient to advise about semen sample. Patient expressed understanding and stated he would bring in another sample tomorrow or Friday.

## 2020-03-11 NOTE — TELEPHONE ENCOUNTER
----- Message from Yuko Connors sent at 3/11/2020 12:23 PM CDT -----  Contact: Oseas from [facility] lab  Type: Needs Medical Advice    Who Called:  Oseas    Best Call Back Number: [phone]  Additional Information: patient dropped off a specimen there and testing is not performed here.

## 2020-03-11 NOTE — TELEPHONE ENCOUNTER
Advised sample should be sent to main campus for analysis. Son stated he would research and call back to advise.

## 2020-07-07 ENCOUNTER — PATIENT MESSAGE (OUTPATIENT)
Dept: INTERNAL MEDICINE | Facility: CLINIC | Age: 40
End: 2020-07-07

## 2020-07-08 NOTE — TELEPHONE ENCOUNTER
The patient informed that [facility] is not testing patients with no Covid symptoms. The patient informed to check non [facility] Covid testing sites.

## 2020-10-16 ENCOUNTER — LAB VISIT (OUTPATIENT)
Dept: LAB | Facility: HOSPITAL | Age: 40
End: 2020-10-16
Attending: NURSE PRACTITIONER
Payer: COMMERCIAL

## 2020-10-16 ENCOUNTER — OFFICE VISIT (OUTPATIENT)
Dept: INTERNAL MEDICINE | Facility: CLINIC | Age: 40
End: 2020-10-16
Payer: COMMERCIAL

## 2020-10-16 VITALS
SYSTOLIC BLOOD PRESSURE: 110 MMHG | WEIGHT: 213.19 LBS | TEMPERATURE: 98 F | HEART RATE: 54 BPM | RESPIRATION RATE: 16 BRPM | HEIGHT: 75 IN | BODY MASS INDEX: 26.51 KG/M2 | DIASTOLIC BLOOD PRESSURE: 78 MMHG

## 2020-10-16 DIAGNOSIS — Z11.3 SCREEN FOR STD (SEXUALLY TRANSMITTED DISEASE): ICD-10-CM

## 2020-10-16 DIAGNOSIS — R53.83 FATIGUE, UNSPECIFIED TYPE: ICD-10-CM

## 2020-10-16 DIAGNOSIS — R00.1 BRADYCARDIA: ICD-10-CM

## 2020-10-16 DIAGNOSIS — Z00.00 ANNUAL PHYSICAL EXAM: Primary | ICD-10-CM

## 2020-10-16 DIAGNOSIS — D50.9 MICROCYTIC ANEMIA: ICD-10-CM

## 2020-10-16 DIAGNOSIS — E55.9 VITAMIN D INSUFFICIENCY: ICD-10-CM

## 2020-10-16 DIAGNOSIS — Z00.00 ANNUAL PHYSICAL EXAM: ICD-10-CM

## 2020-10-16 DIAGNOSIS — Z83.3 FAMILY HISTORY OF DIABETES MELLITUS: ICD-10-CM

## 2020-10-16 DIAGNOSIS — Z13.5 SCREENING FOR EYE CONDITION: ICD-10-CM

## 2020-10-16 LAB
25(OH)D3+25(OH)D2 SERPL-MCNC: 45 NG/ML (ref 30–96)
ALBUMIN SERPL BCP-MCNC: 4.5 G/DL (ref 3.5–5.2)
ALP SERPL-CCNC: 49 U/L (ref 55–135)
ALT SERPL W/O P-5'-P-CCNC: 21 U/L (ref 10–44)
ANION GAP SERPL CALC-SCNC: 8 MMOL/L (ref 8–16)
AST SERPL-CCNC: 22 U/L (ref 10–40)
BASOPHILS # BLD AUTO: 0.06 K/UL (ref 0–0.2)
BASOPHILS NFR BLD: 1.6 % (ref 0–1.9)
BILIRUB SERPL-MCNC: 0.5 MG/DL (ref 0.1–1)
BUN SERPL-MCNC: 9 MG/DL (ref 6–20)
CALCIUM SERPL-MCNC: 9.7 MG/DL (ref 8.7–10.5)
CHLORIDE SERPL-SCNC: 102 MMOL/L (ref 95–110)
CHOLEST SERPL-MCNC: 208 MG/DL (ref 120–199)
CHOLEST/HDLC SERPL: 2.3 {RATIO} (ref 2–5)
CO2 SERPL-SCNC: 29 MMOL/L (ref 23–29)
CREAT SERPL-MCNC: 1.1 MG/DL (ref 0.5–1.4)
DIFFERENTIAL METHOD: ABNORMAL
EOSINOPHIL # BLD AUTO: 0.2 K/UL (ref 0–0.5)
EOSINOPHIL NFR BLD: 3.9 % (ref 0–8)
ERYTHROCYTE [DISTWIDTH] IN BLOOD BY AUTOMATED COUNT: 14.6 % (ref 11.5–14.5)
EST. GFR  (AFRICAN AMERICAN): >60 ML/MIN/1.73 M^2
EST. GFR  (NON AFRICAN AMERICAN): >60 ML/MIN/1.73 M^2
ESTIMATED AVG GLUCOSE: 108 MG/DL (ref 68–131)
FOLATE SERPL-MCNC: 7.8 NG/ML (ref 4–24)
GLUCOSE SERPL-MCNC: 97 MG/DL (ref 70–110)
HAV IGM SERPL QL IA: NEGATIVE
HBA1C MFR BLD HPLC: 5.4 % (ref 4–5.6)
HBV CORE IGM SERPL QL IA: NEGATIVE
HBV SURFACE AG SERPL QL IA: NEGATIVE
HCT VFR BLD AUTO: 45.5 % (ref 40–54)
HCV AB SERPL QL IA: NEGATIVE
HDLC SERPL-MCNC: 89 MG/DL (ref 40–75)
HDLC SERPL: 42.8 % (ref 20–50)
HGB BLD-MCNC: 14 G/DL (ref 14–18)
HIV 1+2 AB+HIV1 P24 AG SERPL QL IA: NEGATIVE
IMM GRANULOCYTES # BLD AUTO: 0.01 K/UL (ref 0–0.04)
IMM GRANULOCYTES NFR BLD AUTO: 0.3 % (ref 0–0.5)
IRON SERPL-MCNC: 84 UG/DL (ref 45–160)
LDLC SERPL CALC-MCNC: 104.6 MG/DL (ref 63–159)
LYMPHOCYTES # BLD AUTO: 1.3 K/UL (ref 1–4.8)
LYMPHOCYTES NFR BLD: 32.8 % (ref 18–48)
MCH RBC QN AUTO: 24.3 PG (ref 27–31)
MCHC RBC AUTO-ENTMCNC: 30.8 G/DL (ref 32–36)
MCV RBC AUTO: 79 FL (ref 82–98)
MONOCYTES # BLD AUTO: 0.5 K/UL (ref 0.3–1)
MONOCYTES NFR BLD: 12.4 % (ref 4–15)
NEUTROPHILS # BLD AUTO: 1.9 K/UL (ref 1.8–7.7)
NEUTROPHILS NFR BLD: 49 % (ref 38–73)
NONHDLC SERPL-MCNC: 119 MG/DL
NRBC BLD-RTO: 0 /100 WBC
PLATELET # BLD AUTO: 278 K/UL (ref 150–350)
PMV BLD AUTO: 10.9 FL (ref 9.2–12.9)
POTASSIUM SERPL-SCNC: 4.4 MMOL/L (ref 3.5–5.1)
PROT SERPL-MCNC: 7.6 G/DL (ref 6–8.4)
RBC # BLD AUTO: 5.75 M/UL (ref 4.6–6.2)
SATURATED IRON: 19 % (ref 20–50)
SODIUM SERPL-SCNC: 139 MMOL/L (ref 136–145)
TESTOST SERPL-MCNC: 778 NG/DL (ref 304–1227)
TOTAL IRON BINDING CAPACITY: 440 UG/DL (ref 250–450)
TRANSFERRIN SERPL-MCNC: 297 MG/DL (ref 200–375)
TRIGL SERPL-MCNC: 72 MG/DL (ref 30–150)
TSH SERPL DL<=0.005 MIU/L-ACNC: 1.22 UIU/ML (ref 0.4–4)
WBC # BLD AUTO: 3.87 K/UL (ref 3.9–12.7)

## 2020-10-16 PROCEDURE — 84403 ASSAY OF TOTAL TESTOSTERONE: CPT

## 2020-10-16 PROCEDURE — 82746 ASSAY OF FOLIC ACID SERUM: CPT

## 2020-10-16 PROCEDURE — 90686 FLU VACCINE (QUAD) GREATER THAN OR EQUAL TO 3YO PRESERVATIVE FREE IM: ICD-10-PCS | Mod: S$GLB,,, | Performed by: NURSE PRACTITIONER

## 2020-10-16 PROCEDURE — 90686 IIV4 VACC NO PRSV 0.5 ML IM: CPT | Mod: S$GLB,,, | Performed by: NURSE PRACTITIONER

## 2020-10-16 PROCEDURE — 84443 ASSAY THYROID STIM HORMONE: CPT

## 2020-10-16 PROCEDURE — 80074 ACUTE HEPATITIS PANEL: CPT

## 2020-10-16 PROCEDURE — 90471 IMMUNIZATION ADMIN: CPT | Mod: S$GLB,,, | Performed by: NURSE PRACTITIONER

## 2020-10-16 PROCEDURE — 80061 LIPID PANEL: CPT

## 2020-10-16 PROCEDURE — 82306 VITAMIN D 25 HYDROXY: CPT

## 2020-10-16 PROCEDURE — 99999 PR PBB SHADOW E&M-EST. PATIENT-LVL III: ICD-10-PCS | Mod: PBBFAC,,, | Performed by: NURSE PRACTITIONER

## 2020-10-16 PROCEDURE — 85025 COMPLETE CBC W/AUTO DIFF WBC: CPT

## 2020-10-16 PROCEDURE — 99395 PR PREVENTIVE VISIT,EST,18-39: ICD-10-PCS | Mod: 25,S$GLB,, | Performed by: NURSE PRACTITIONER

## 2020-10-16 PROCEDURE — 86703 HIV-1/HIV-2 1 RESULT ANTBDY: CPT

## 2020-10-16 PROCEDURE — 86694 HERPES SIMPLEX NES ANTBDY: CPT

## 2020-10-16 PROCEDURE — 80053 COMPREHEN METABOLIC PANEL: CPT

## 2020-10-16 PROCEDURE — 99999 PR PBB SHADOW E&M-EST. PATIENT-LVL III: CPT | Mod: PBBFAC,,, | Performed by: NURSE PRACTITIONER

## 2020-10-16 PROCEDURE — 86696 HERPES SIMPLEX TYPE 2 TEST: CPT

## 2020-10-16 PROCEDURE — 83036 HEMOGLOBIN GLYCOSYLATED A1C: CPT

## 2020-10-16 PROCEDURE — 90471 FLU VACCINE (QUAD) GREATER THAN OR EQUAL TO 3YO PRESERVATIVE FREE IM: ICD-10-PCS | Mod: S$GLB,,, | Performed by: NURSE PRACTITIONER

## 2020-10-16 PROCEDURE — 99395 PREV VISIT EST AGE 18-39: CPT | Mod: 25,S$GLB,, | Performed by: NURSE PRACTITIONER

## 2020-10-16 PROCEDURE — 83540 ASSAY OF IRON: CPT

## 2020-10-16 PROCEDURE — 86592 SYPHILIS TEST NON-TREP QUAL: CPT

## 2020-10-16 NOTE — PROGRESS NOTES
[facility] Note    Chief Complaint      Chief Complaint   Patient presents with    Annual Exam     History of Present Illness      Tre Sommer Sr. is a 40 y.o. male patient of Dr. Ibrahim who is new to me presents today for annual visit exam.  Patient feeling well no complaints, denies shortness of breath or chest pain, reviewed meds and history of patient.  Not been able to work out as much since COVID, has not been able to eat as well either.    Lipid disorders/ASCVD risk (ages >/= 45 or >/= 20 if increased risk ): ordered  DM (>45y yearly or if obese, HTN): A1c ordered  Eye exam: due, ordered     Vaccines:   Influenza (yearly)- today in office  Tetanus (every 10 yrs - 1st tdap) done 2017     Exercise: 5-6x/week crossfit and jogging 2-3x a week   Diet: Regular     Problem List Items Addressed This Visit        Cardiac/Vascular    Bradycardia    Relevant Orders    CBC auto differential    Comprehensive Metabolic Panel    TSH    Vitamin D    Lipid Panel    Hemoglobin A1C    Iron and TIBC    Folate    C. trachomatis/N. gonorrhoeae by AMP DNA [facility]; Urine    RPR    HIV 1/2 Ag/Ab (4th Gen)    Herpes simplex type 1&2 IgG,Herpes titer    Herpes simplex type 1 & 2 IgM,Herpes IgM    Hepatitis panel, acute    Testosterone      Other Visit Diagnoses     Annual physical exam    -  Primary    Relevant Orders    CBC auto differential    Comprehensive Metabolic Panel    TSH    Vitamin D    Lipid Panel    Hemoglobin A1C    Iron and TIBC    Folate    C. trachomatis/N. gonorrhoeae by AMP DNA [facility]; Urine    RPR    HIV 1/2 Ag/Ab (4th Gen)    Herpes simplex type 1&2 IgG,Herpes titer    Herpes simplex type 1 & 2 IgM,Herpes IgM    Hepatitis panel, acute    Testosterone    Screen for STD (sexually transmitted disease)        Relevant Orders    CBC auto differential    Comprehensive Metabolic Panel    TSH    Vitamin D    Lipid Panel    Hemoglobin A1C    Iron and TIBC    Folate    C. trachomatis/N. gonorrhoeae by  AMP DNA [facility]; Urine    RPR    HIV 1/2 Ag/Ab (4th Gen)    Herpes simplex type 1&2 IgG,Herpes titer    Herpes simplex type 1 & 2 IgM,Herpes IgM    Hepatitis panel, acute    Testosterone    Microcytic anemia        Relevant Orders    CBC auto differential    Comprehensive Metabolic Panel    TSH    Vitamin D    Lipid Panel    Hemoglobin A1C    Iron and TIBC    Folate    C. trachomatis/N. gonorrhoeae by AMP DNA [facility]; Urine    RPR    HIV 1/2 Ag/Ab (4th Gen)    Herpes simplex type 1&2 IgG,Herpes titer    Herpes simplex type 1 & 2 IgM,Herpes IgM    Hepatitis panel, acute    Testosterone    Family history of diabetes mellitus        Relevant Orders    CBC auto differential    Comprehensive Metabolic Panel    TSH    Vitamin D    Lipid Panel    Hemoglobin A1C    Iron and TIBC    Folate    C. trachomatis/N. gonorrhoeae by AMP DNA [facility]; Urine    RPR    HIV 1/2 Ag/Ab (4th Gen)    Herpes simplex type 1&2 IgG,Herpes titer    Herpes simplex type 1 & 2 IgM,Herpes IgM    Hepatitis panel, acute    Testosterone    Fatigue, unspecified type        Relevant Orders    CBC auto differential    Comprehensive Metabolic Panel    TSH    Vitamin D    Lipid Panel    Hemoglobin A1C    Iron and TIBC    Folate    C. trachomatis/N. gonorrhoeae by AMP DNA [facility]; Urine    RPR    HIV 1/2 Ag/Ab (4th Gen)    Herpes simplex type 1&2 IgG,Herpes titer    Herpes simplex type 1 & 2 IgM,Herpes IgM    Hepatitis panel, acute    Testosterone    Vitamin D insufficiency        Relevant Orders    CBC auto differential    Comprehensive Metabolic Panel    TSH    Vitamin D    Lipid Panel    Hemoglobin A1C    Iron and TIBC    Folate    C. trachomatis/N. gonorrhoeae by AMP DNA [facility]; Urine    RPR    HIV 1/2 Ag/Ab (4th Gen)    Herpes simplex type 1&2 IgG,Herpes titer    Herpes simplex type 1 & 2 IgM,Herpes IgM    Hepatitis panel, acute    Testosterone    Screening for eye condition        Relevant Orders    Ambulatory referral/consult to Ophthalmology           Health Maintenance   Topic Date Due    Lipid Panel  11/11/2024    TETANUS VACCINE  07/03/2027    Hepatitis C Screening  Completed       Past Medical History:   Diagnosis Date    Syncope and collapse        Past Surgical History:   Procedure Laterality Date    ADENOIDECTOMY      CIRCUMCISION, PRIMARY      SOFT TISSUE CYST EXCISION      Epidermal Inclusion Cyst    Tonsilectomy      Age 5 or 6    TONSILLECTOMY      VASECTOMY         family history includes Cancer in his maternal grandmother; Diabetes in his father.     Social History     Tobacco Use    Smoking status: Never Smoker    Smokeless tobacco: Never Used   Substance Use Topics    Alcohol use: Yes     Frequency: 2-4 times a month     Drinks per session: 1 or 2     Binge frequency: Never     Comment: occasional    Drug use: No       Review of Systems   Constitutional: Negative for chills and fever.   HENT: Negative for congestion, sinus pain and sore throat.    Eyes: Negative for blurred vision.   Respiratory: Negative for cough, shortness of breath and wheezing.    Cardiovascular: Negative for chest pain, palpitations and leg swelling.   Gastrointestinal: Negative for abdominal pain, constipation, diarrhea, nausea and vomiting.   Genitourinary: Negative for dysuria.   Musculoskeletal: Negative for myalgias.   Skin: Negative for rash.   Neurological: Negative for dizziness, weakness and headaches.   Psychiatric/Behavioral: Negative for depression. The patient is not nervous/anxious.         Outpatient Encounter Medications as of 10/16/2020   Medication Sig Note Dispense Refill    multivitamin with minerals tablet Take 1 tablet by mouth once daily. 10/7/2015: Hold am of surgery      fluticasone propionate (FLONASE) 50 mcg/actuation nasal spray 1 spray (50 mcg total) by Each Nostril route 2 (two) times daily.  1 Bottle 0     No facility-administered encounter medications on file as of 10/16/2020.        Review of patient's allergies  "indicates:  No Known Allergies    Physical Exam      Vital Signs  Temp: 98 °F (36.7 °C)  Temp src: Oral  Pulse: (!) 54  Resp: 16  BP: 110/78  Pain Score: 0-No pain  Height and Weight  Height: 6' 3" (190.5 cm)  Weight: 96.7 kg (213 lb 3 oz)  BSA (Calculated - sq m): 2.26 sq meters  BMI (Calculated): 26.6  Weight in (lb) to have BMI = 25: 199.6]    Physical Exam  Vitals signs and nursing note reviewed.   Constitutional:       Appearance: Normal appearance. He is well-developed.   HENT:      Head: Normocephalic and atraumatic.      Right Ear: External ear normal.      Left Ear: External ear normal.   Eyes:      Conjunctiva/sclera: Conjunctivae normal.      Pupils: Pupils are equal, round, and reactive to light.   Neck:      Musculoskeletal: Normal range of motion and neck supple.      Thyroid: No thyromegaly.      Vascular: No JVD.      Trachea: No tracheal deviation.   Cardiovascular:      Rate and Rhythm: Normal rate and regular rhythm.      Heart sounds: Normal heart sounds.   Pulmonary:      Effort: Pulmonary effort is normal.      Breath sounds: Normal breath sounds.   Abdominal:      General: Bowel sounds are normal.      Palpations: Abdomen is soft.   Musculoskeletal: Normal range of motion.   Skin:     General: Skin is warm and dry.   Neurological:      Mental Status: He is alert and oriented to person, place, and time.   Psychiatric:         Behavior: Behavior normal.         Thought Content: Thought content normal.         Judgment: Judgment normal.          Laboratory:  CBC:  No results for input(s): WBC, RBC, HGB, HCT, PLT, MCV, MCH, MCHC in the last 2160 hours.  CMP:  No results for input(s): GLU, CALCIUM, ALBUMIN, PROT, NA, K, CO2, CL, BUN, ALKPHOS, ALT, AST, BILITOT in the last 2160 hours.    Invalid input(s): CREATININ  URINALYSIS:  No results for input(s): COLORU, CLARITYU, SPECGRAV, PHUR, PROTEINUA, GLUCOSEU, BILIRUBINCON, BLOODU, WBCU, RBCU, BACTERIA, MUCUS, NITRITE, LEUKOCYTESUR, UROBILINOGEN, " HYALINECASTS in the last 2160 hours.   LIPIDS:  No results for input(s): TSH, HDL, CHOL, TRIG, LDLCALC, CHOLHDL, NONHDLCHOL, TOTALCHOLEST in the last 2160 hours.  TSH:  No results for input(s): TSH in the last 2160 hours.  A1C:  No results for input(s): HGBA1C in the last 2160 hours.      Assessment/Plan     Tre Sommer Sr. is a 40 y.o.male with:    Encounter Diagnoses   Name Primary?    Annual physical exam Yes    Bradycardia     Screen for STD (sexually transmitted disease)     Microcytic anemia     Family history of diabetes mellitus     Fatigue, unspecified type     Vitamin D insufficiency     Screening for eye condition             -Continue current medications and maintain follow up with specialists.  Return to clinic in 1 year with  or sooner for any concerns.         Erin Jiminez, NP-C [facility]

## 2020-10-17 LAB — RPR SER QL: NORMAL

## 2020-10-19 ENCOUNTER — PATIENT MESSAGE (OUTPATIENT)
Dept: INTERNAL MEDICINE | Facility: CLINIC | Age: 40
End: 2020-10-19

## 2020-10-19 LAB
HSV AB, IGM BY EIA: 0.56 INDEX
HSV1 IGG SERPL QL IA: POSITIVE
HSV2 IGG SERPL QL IA: NEGATIVE

## 2020-10-20 ENCOUNTER — PATIENT MESSAGE (OUTPATIENT)
Dept: INTERNAL MEDICINE | Facility: CLINIC | Age: 40
End: 2020-10-20

## 2020-10-20 NOTE — TELEPHONE ENCOUNTER
Testosterone level in normal limits  Hepatitis, HIV, syphilis all in normal limits  HSV-1 positive (oral) which looks like just exposure- may cause fever blister  No infection blood level stable  Electrolyte, kidney, liver function all normal  Thyroid level normal  Vitamin-D level very good  Cholesterol panel good, HDL is good cholesterol so not concerned that is elevated  A1c shows no diabetes  Iron levels good make sure he is taking multivitamins or eating dark leafy greens-foods that have iron in it

## 2021-02-08 ENCOUNTER — OFFICE VISIT (OUTPATIENT)
Dept: INTERNAL MEDICINE | Facility: CLINIC | Age: 41
End: 2021-02-08
Payer: COMMERCIAL

## 2021-02-08 DIAGNOSIS — F52.0 LACK OF LIBIDO: ICD-10-CM

## 2021-02-08 DIAGNOSIS — N52.8 OTHER MALE ERECTILE DYSFUNCTION: Primary | ICD-10-CM

## 2021-02-08 PROCEDURE — 99213 OFFICE O/P EST LOW 20 MIN: CPT | Mod: 95,,, | Performed by: NURSE PRACTITIONER

## 2021-02-08 PROCEDURE — 99213 PR OFFICE/OUTPT VISIT, EST, LEVL III, 20-29 MIN: ICD-10-PCS | Mod: 95,,, | Performed by: NURSE PRACTITIONER

## 2021-02-15 ENCOUNTER — OFFICE VISIT (OUTPATIENT)
Dept: UROLOGY | Facility: CLINIC | Age: 41
End: 2021-02-15
Payer: COMMERCIAL

## 2021-02-15 DIAGNOSIS — F52.0 LACK OF LIBIDO: ICD-10-CM

## 2021-02-15 DIAGNOSIS — N52.8 OTHER MALE ERECTILE DYSFUNCTION: Primary | ICD-10-CM

## 2021-02-15 PROCEDURE — 99204 OFFICE O/P NEW MOD 45 MIN: CPT | Mod: 95,,, | Performed by: STUDENT IN AN ORGANIZED HEALTH CARE EDUCATION/TRAINING PROGRAM

## 2021-02-15 PROCEDURE — 99204 PR OFFICE/OUTPT VISIT, NEW, LEVL IV, 45-59 MIN: ICD-10-PCS | Mod: 95,,, | Performed by: STUDENT IN AN ORGANIZED HEALTH CARE EDUCATION/TRAINING PROGRAM

## 2021-02-15 RX ORDER — TADALAFIL 20 MG/1
20 TABLET ORAL DAILY
Qty: 15 TABLET | Refills: 0 | Status: SHIPPED | OUTPATIENT
Start: 2021-02-15 | End: 2022-01-18 | Stop reason: SDUPTHER

## 2021-04-29 ENCOUNTER — PATIENT MESSAGE (OUTPATIENT)
Dept: RESEARCH | Facility: HOSPITAL | Age: 41
End: 2021-04-29

## 2022-01-18 ENCOUNTER — OFFICE VISIT (OUTPATIENT)
Dept: INTERNAL MEDICINE | Facility: CLINIC | Age: 42
End: 2022-01-18
Payer: COMMERCIAL

## 2022-01-18 ENCOUNTER — LAB VISIT (OUTPATIENT)
Dept: LAB | Facility: HOSPITAL | Age: 42
End: 2022-01-18
Attending: NURSE PRACTITIONER
Payer: COMMERCIAL

## 2022-01-18 VITALS
RESPIRATION RATE: 16 BRPM | HEIGHT: 75 IN | WEIGHT: 212.88 LBS | TEMPERATURE: 99 F | HEART RATE: 65 BPM | SYSTOLIC BLOOD PRESSURE: 120 MMHG | OXYGEN SATURATION: 98 % | DIASTOLIC BLOOD PRESSURE: 86 MMHG | BODY MASS INDEX: 26.47 KG/M2

## 2022-01-18 DIAGNOSIS — Z13.220 ENCOUNTER FOR LIPID SCREENING FOR CARDIOVASCULAR DISEASE: ICD-10-CM

## 2022-01-18 DIAGNOSIS — Z01.84 ANTIBODY RESPONSE EXAM: ICD-10-CM

## 2022-01-18 DIAGNOSIS — E61.1 IRON DEFICIENCY: ICD-10-CM

## 2022-01-18 DIAGNOSIS — Z13.6 ENCOUNTER FOR LIPID SCREENING FOR CARDIOVASCULAR DISEASE: ICD-10-CM

## 2022-01-18 DIAGNOSIS — Z13.1 SCREENING FOR DIABETES MELLITUS: ICD-10-CM

## 2022-01-18 DIAGNOSIS — Z11.3 SCREEN FOR STD (SEXUALLY TRANSMITTED DISEASE): ICD-10-CM

## 2022-01-18 DIAGNOSIS — Z00.00 ANNUAL PHYSICAL EXAM: ICD-10-CM

## 2022-01-18 DIAGNOSIS — R68.82 LOW LIBIDO: ICD-10-CM

## 2022-01-18 DIAGNOSIS — Z00.00 ANNUAL PHYSICAL EXAM: Primary | ICD-10-CM

## 2022-01-18 LAB
ALBUMIN SERPL BCP-MCNC: 4.7 G/DL (ref 3.5–5.2)
ALP SERPL-CCNC: 49 U/L (ref 55–135)
ALT SERPL W/O P-5'-P-CCNC: 20 U/L (ref 10–44)
ANION GAP SERPL CALC-SCNC: 11 MMOL/L (ref 8–16)
AST SERPL-CCNC: 25 U/L (ref 10–40)
BASOPHILS # BLD AUTO: 0.04 K/UL (ref 0–0.2)
BASOPHILS NFR BLD: 1.1 % (ref 0–1.9)
BILIRUB SERPL-MCNC: 0.9 MG/DL (ref 0.1–1)
BUN SERPL-MCNC: 9 MG/DL (ref 6–20)
CALCIUM SERPL-MCNC: 9.9 MG/DL (ref 8.7–10.5)
CHLORIDE SERPL-SCNC: 102 MMOL/L (ref 95–110)
CHOLEST SERPL-MCNC: 194 MG/DL (ref 120–199)
CHOLEST/HDLC SERPL: 2.3 {RATIO} (ref 2–5)
CO2 SERPL-SCNC: 25 MMOL/L (ref 23–29)
CREAT SERPL-MCNC: 1.1 MG/DL (ref 0.5–1.4)
DIFFERENTIAL METHOD: ABNORMAL
EOSINOPHIL # BLD AUTO: 0.1 K/UL (ref 0–0.5)
EOSINOPHIL NFR BLD: 2.4 % (ref 0–8)
ERYTHROCYTE [DISTWIDTH] IN BLOOD BY AUTOMATED COUNT: 13.5 % (ref 11.5–14.5)
EST. GFR  (AFRICAN AMERICAN): >60 ML/MIN/1.73 M^2
EST. GFR  (NON AFRICAN AMERICAN): >60 ML/MIN/1.73 M^2
ESTIMATED AVG GLUCOSE: 108 MG/DL (ref 68–131)
FERRITIN SERPL-MCNC: 124 NG/ML (ref 20–300)
GLUCOSE SERPL-MCNC: 86 MG/DL (ref 70–110)
HBA1C MFR BLD: 5.4 % (ref 4–5.6)
HCT VFR BLD AUTO: 44.9 % (ref 40–54)
HDLC SERPL-MCNC: 83 MG/DL (ref 40–75)
HDLC SERPL: 42.8 % (ref 20–50)
HGB BLD-MCNC: 13.9 G/DL (ref 14–18)
IMM GRANULOCYTES # BLD AUTO: 0.01 K/UL (ref 0–0.04)
IMM GRANULOCYTES NFR BLD AUTO: 0.3 % (ref 0–0.5)
IRON SERPL-MCNC: 110 UG/DL (ref 45–160)
LDLC SERPL CALC-MCNC: 100.4 MG/DL (ref 63–159)
LYMPHOCYTES # BLD AUTO: 1.1 K/UL (ref 1–4.8)
LYMPHOCYTES NFR BLD: 29 % (ref 18–48)
MCH RBC QN AUTO: 23.9 PG (ref 27–31)
MCHC RBC AUTO-ENTMCNC: 31 G/DL (ref 32–36)
MCV RBC AUTO: 77 FL (ref 82–98)
MONOCYTES # BLD AUTO: 0.4 K/UL (ref 0.3–1)
MONOCYTES NFR BLD: 10.3 % (ref 4–15)
NEUTROPHILS # BLD AUTO: 2.2 K/UL (ref 1.8–7.7)
NEUTROPHILS NFR BLD: 56.9 % (ref 38–73)
NONHDLC SERPL-MCNC: 111 MG/DL
NRBC BLD-RTO: 0 /100 WBC
PLATELET # BLD AUTO: 258 K/UL (ref 150–450)
PMV BLD AUTO: 9.9 FL (ref 9.2–12.9)
POTASSIUM SERPL-SCNC: 4.1 MMOL/L (ref 3.5–5.1)
PROT SERPL-MCNC: 7.7 G/DL (ref 6–8.4)
RBC # BLD AUTO: 5.81 M/UL (ref 4.6–6.2)
SARS-COV-2 IGG SERPL IA-ACNC: 2049.2 AU/ML
SARS-COV-2 IGG SERPL QL IA: POSITIVE
SATURATED IRON: 26 % (ref 20–50)
SODIUM SERPL-SCNC: 138 MMOL/L (ref 136–145)
TESTOST SERPL-MCNC: 600 NG/DL (ref 304–1227)
TOTAL IRON BINDING CAPACITY: 416 UG/DL (ref 250–450)
TRANSFERRIN SERPL-MCNC: 281 MG/DL (ref 200–375)
TRIGL SERPL-MCNC: 53 MG/DL (ref 30–150)
TSH SERPL DL<=0.005 MIU/L-ACNC: 1.54 UIU/ML (ref 0.4–4)
WBC # BLD AUTO: 3.79 K/UL (ref 3.9–12.7)

## 2022-01-18 PROCEDURE — 80061 LIPID PANEL: CPT | Performed by: NURSE PRACTITIONER

## 2022-01-18 PROCEDURE — 99396 PR PREVENTIVE VISIT,EST,40-64: ICD-10-PCS | Mod: S$GLB,,, | Performed by: NURSE PRACTITIONER

## 2022-01-18 PROCEDURE — 1160F PR REVIEW ALL MEDS BY PRESCRIBER/CLIN PHARMACIST DOCUMENTED: ICD-10-PCS | Mod: CPTII,S$GLB,, | Performed by: NURSE PRACTITIONER

## 2022-01-18 PROCEDURE — 1159F MED LIST DOCD IN RCRD: CPT | Mod: CPTII,S$GLB,, | Performed by: NURSE PRACTITIONER

## 2022-01-18 PROCEDURE — 36415 COLL VENOUS BLD VENIPUNCTURE: CPT | Performed by: NURSE PRACTITIONER

## 2022-01-18 PROCEDURE — 87389 HIV-1 AG W/HIV-1&-2 AB AG IA: CPT | Performed by: NURSE PRACTITIONER

## 2022-01-18 PROCEDURE — 1159F PR MEDICATION LIST DOCUMENTED IN MEDICAL RECORD: ICD-10-PCS | Mod: CPTII,S$GLB,, | Performed by: NURSE PRACTITIONER

## 2022-01-18 PROCEDURE — 99999 PR PBB SHADOW E&M-EST. PATIENT-LVL III: CPT | Mod: PBBFAC,,, | Performed by: NURSE PRACTITIONER

## 2022-01-18 PROCEDURE — 3079F PR MOST RECENT DIASTOLIC BLOOD PRESSURE 80-89 MM HG: ICD-10-PCS | Mod: CPTII,S$GLB,, | Performed by: NURSE PRACTITIONER

## 2022-01-18 PROCEDURE — 1160F RVW MEDS BY RX/DR IN RCRD: CPT | Mod: CPTII,S$GLB,, | Performed by: NURSE PRACTITIONER

## 2022-01-18 PROCEDURE — 86592 SYPHILIS TEST NON-TREP QUAL: CPT | Performed by: NURSE PRACTITIONER

## 2022-01-18 PROCEDURE — 80053 COMPREHEN METABOLIC PANEL: CPT | Performed by: NURSE PRACTITIONER

## 2022-01-18 PROCEDURE — 3008F BODY MASS INDEX DOCD: CPT | Mod: CPTII,S$GLB,, | Performed by: NURSE PRACTITIONER

## 2022-01-18 PROCEDURE — 82728 ASSAY OF FERRITIN: CPT | Performed by: NURSE PRACTITIONER

## 2022-01-18 PROCEDURE — 84403 ASSAY OF TOTAL TESTOSTERONE: CPT | Performed by: NURSE PRACTITIONER

## 2022-01-18 PROCEDURE — 86694 HERPES SIMPLEX NES ANTBDY: CPT | Performed by: NURSE PRACTITIONER

## 2022-01-18 PROCEDURE — 3079F DIAST BP 80-89 MM HG: CPT | Mod: CPTII,S$GLB,, | Performed by: NURSE PRACTITIONER

## 2022-01-18 PROCEDURE — 80074 ACUTE HEPATITIS PANEL: CPT | Performed by: NURSE PRACTITIONER

## 2022-01-18 PROCEDURE — 84443 ASSAY THYROID STIM HORMONE: CPT | Performed by: NURSE PRACTITIONER

## 2022-01-18 PROCEDURE — 3008F PR BODY MASS INDEX (BMI) DOCUMENTED: ICD-10-PCS | Mod: CPTII,S$GLB,, | Performed by: NURSE PRACTITIONER

## 2022-01-18 PROCEDURE — 3074F PR MOST RECENT SYSTOLIC BLOOD PRESSURE < 130 MM HG: ICD-10-PCS | Mod: CPTII,S$GLB,, | Performed by: NURSE PRACTITIONER

## 2022-01-18 PROCEDURE — 86769 SARS-COV-2 COVID-19 ANTIBODY: CPT | Performed by: NURSE PRACTITIONER

## 2022-01-18 PROCEDURE — 84466 ASSAY OF TRANSFERRIN: CPT | Performed by: NURSE PRACTITIONER

## 2022-01-18 PROCEDURE — 86695 HERPES SIMPLEX TYPE 1 TEST: CPT | Performed by: NURSE PRACTITIONER

## 2022-01-18 PROCEDURE — 86696 HERPES SIMPLEX TYPE 2 TEST: CPT | Performed by: NURSE PRACTITIONER

## 2022-01-18 PROCEDURE — 83036 HEMOGLOBIN GLYCOSYLATED A1C: CPT | Performed by: NURSE PRACTITIONER

## 2022-01-18 PROCEDURE — 99396 PREV VISIT EST AGE 40-64: CPT | Mod: S$GLB,,, | Performed by: NURSE PRACTITIONER

## 2022-01-18 PROCEDURE — 99999 PR PBB SHADOW E&M-EST. PATIENT-LVL III: ICD-10-PCS | Mod: PBBFAC,,, | Performed by: NURSE PRACTITIONER

## 2022-01-18 PROCEDURE — 3074F SYST BP LT 130 MM HG: CPT | Mod: CPTII,S$GLB,, | Performed by: NURSE PRACTITIONER

## 2022-01-18 PROCEDURE — 85025 COMPLETE CBC W/AUTO DIFF WBC: CPT | Performed by: NURSE PRACTITIONER

## 2022-01-18 RX ORDER — TADALAFIL 20 MG/1
20 TABLET ORAL DAILY
Qty: 15 TABLET | Refills: 0 | Status: SHIPPED | OUTPATIENT
Start: 2022-01-18 | End: 2022-07-29 | Stop reason: SDUPTHER

## 2022-01-18 NOTE — PROGRESS NOTES
[facility] Note    Chief Complaint      Chief Complaint   Patient presents with    Annual Exam     History of Present Illness      Tre Sommer Sr. is a 41 y.o. male patient of Dr. Ibrahim who is new to me and presents today for annual. Pt feeling well, no complaints, denies sob or cp, reviewed meds and history with pt. Pt is currently going through a fit challenge. No concerns with bowel or bladder function. Eat well, stays hydrated    labs- ordered  Eye exams-utd      Vaccines:  covid- moderna x 2, will get antibody test  Flu shot- due   Tdap- 2017          Health Maintenance   Topic Date Due    Lipid Panel  10/16/2025    TETANUS VACCINE  07/03/2027    Hepatitis C Screening  Completed       Past Medical History:   Diagnosis Date    Syncope and collapse        Past Surgical History:   Procedure Laterality Date    ADENOIDECTOMY      CIRCUMCISION, PRIMARY      SOFT TISSUE CYST EXCISION      Epidermal Inclusion Cyst    Tonsilectomy      Age 5 or 6    TONSILLECTOMY      VASECTOMY         family history includes Cancer in his maternal grandmother; Diabetes in his father.     Social History     Tobacco Use    Smoking status: Never Smoker    Smokeless tobacco: Never Used   Substance Use Topics    Alcohol use: Yes     Comment: occasional    Drug use: No       Review of Systems   Constitutional: Negative for chills and fever.   HENT: Negative for congestion, hearing loss, sinus pain and sore throat.    Eyes: Negative for blurred vision and discharge.   Respiratory: Negative for cough, shortness of breath and wheezing.    Cardiovascular: Negative for chest pain, palpitations and leg swelling.   Gastrointestinal: Negative for abdominal pain, blood in stool, constipation, diarrhea, nausea and vomiting.   Genitourinary: Negative for dysuria, hematuria and urgency.   Musculoskeletal: Negative for myalgias and neck pain.   Skin: Negative for rash.   Neurological: Negative for dizziness, weakness  "and headaches.   Endo/Heme/Allergies: Negative for polydipsia.   Psychiatric/Behavioral: Negative for depression. The patient is not nervous/anxious.         Outpatient Encounter Medications as of 1/18/2022   Medication Sig Note Dispense Refill    multivitamin with minerals tablet Take 1 tablet by mouth once daily. 10/7/2015: Hold am of surgery      [DISCONTINUED] tadalafiL (CIALIS) 20 MG Tab Take 1 tablet (20 mg total) by mouth once daily. for 15 days  15 tablet 0    tadalafiL (CIALIS) 20 MG Tab Take 1 tablet (20 mg total) by mouth once daily. for 15 days  15 tablet 0     No facility-administered encounter medications on file as of 1/18/2022.       Review of patient's allergies indicates:  No Known Allergies    Physical Exam      Vital Signs  Temp: 98.5 °F (36.9 °C)  Temp src: Oral  Pulse: 65  Resp: 16  SpO2: 98 %  BP: 120/86  BP Location: Left arm  Patient Position: Sitting  Height and Weight  Height: 6' 3" (190.5 cm)  Weight: 96.6 kg (212 lb 13.7 oz)  BSA (Calculated - sq m): 2.26 sq meters  BMI (Calculated): 26.6  Weight in (lb) to have BMI = 25: 199.6    Physical Exam  Vitals and nursing note reviewed.   Constitutional:       General: He is not in acute distress.     Appearance: Normal appearance. He is well-developed and well-nourished. He is not ill-appearing.   HENT:      Head: Normocephalic and atraumatic.      Right Ear: External ear normal.      Left Ear: External ear normal.      Mouth/Throat:      Mouth: Oropharynx is clear and moist.   Eyes:      Extraocular Movements: EOM normal.      Conjunctiva/sclera: Conjunctivae normal.      Pupils: Pupils are equal, round, and reactive to light.   Neck:      Thyroid: No thyromegaly.      Vascular: No JVD.      Trachea: No tracheal deviation.   Cardiovascular:      Rate and Rhythm: Normal rate and regular rhythm.      Pulses: Intact distal pulses.      Heart sounds: Normal heart sounds.   Pulmonary:      Effort: Pulmonary effort is normal. No respiratory " distress.      Breath sounds: Normal breath sounds.   Abdominal:      General: Bowel sounds are normal. There is no distension.      Palpations: Abdomen is soft.      Tenderness: There is no abdominal tenderness.   Musculoskeletal:         General: Normal range of motion.      Cervical back: Normal range of motion and neck supple.   Lymphadenopathy:      Cervical: No cervical adenopathy.   Skin:     General: Skin is warm and dry.      Coloration: Skin is not pale.      Findings: No erythema or rash.   Neurological:      Mental Status: He is alert and oriented to person, place, and time.   Psychiatric:         Mood and Affect: Mood and affect normal.         Behavior: Behavior normal.         Thought Content: Thought content normal.         Judgment: Judgment normal.          Laboratory:  CBC:  Lab Results   Component Value Date    WBC 3.87 (L) 10/16/2020    RBC 5.75 10/16/2020    HGB 14.0 10/16/2020    HCT 45.5 10/16/2020     10/16/2020    MCV 79 (L) 10/16/2020    MCH 24.3 (L) 10/16/2020    MCHC 30.8 (L) 10/16/2020    MCHC 30.0 (L) 11/11/2019    MCHC 30.3 (L) 11/17/2018     CMP:  Lab Results   Component Value Date    GLU 97 10/16/2020    CALCIUM 9.7 10/16/2020    ALBUMIN 4.5 10/16/2020    PROT 7.6 10/16/2020     10/16/2020    K 4.4 10/16/2020    CO2 29 10/16/2020     10/16/2020    BUN 9 10/16/2020    ALKPHOS 49 (L) 10/16/2020    ALT 21 10/16/2020    AST 22 10/16/2020    BILITOT 0.5 10/16/2020    BILITOT 0.6 11/11/2019    BILITOT 0.7 11/17/2018     URINALYSIS:  Lab Results   Component Value Date    COLORU Yellow 11/11/2019    SPECGRAV 1.010 11/11/2019    PHUR 7.0 11/11/2019    PROTEINUA Negative 11/11/2019    NITRITE Negative 11/11/2019    LEUKOCYTESUR Negative 11/11/2019    UROBILINOGEN Negative 08/13/2016      LIPIDS:  Lab Results   Component Value Date    TSH 1.220 10/16/2020    TSH 1.435 11/11/2019    TSH 0.997 11/17/2018    HDL 89 (H) 10/16/2020    HDL 76 (H) 11/11/2019    HDL 77 (H)  11/17/2018    CHOL 208 (H) 10/16/2020    CHOL 197 11/11/2019    CHOL 190 11/17/2018    TRIG 72 10/16/2020    TRIG 75 11/11/2019    TRIG 70 11/17/2018    LDLCALC 104.6 10/16/2020    LDLCALC 106.0 11/11/2019    LDLCALC 99.0 11/17/2018    CHOLHDL 42.8 10/16/2020    CHOLHDL 38.6 11/11/2019    CHOLHDL 40.5 11/17/2018    NONHDLCHOL 119 10/16/2020    NONHDLCHOL 121 11/11/2019    NONHDLCHOL 113 11/17/2018    TOTALCHOLEST 2.3 10/16/2020    TOTALCHOLEST 2.6 11/11/2019    TOTALCHOLEST 2.5 11/17/2018     TSH:  Lab Results   Component Value Date    TSH 1.220 10/16/2020    TSH 1.435 11/11/2019    TSH 0.997 11/17/2018     A1C:  Lab Results   Component Value Date    HGBA1C 5.4 10/16/2020    HGBA1C 5.4 11/11/2019    HGBA1C 5.6 07/03/2017    HGBA1C 5.5 04/01/2015    HGBA1C 5.6 07/01/2013         Assessment/Plan     Tre Sommer Sr. is a 41 y.o.male with:    Annual physical exam  -     CBC Auto Differential; Future; Expected date: 01/18/2022  -     Comprehensive Metabolic Panel; Future; Expected date: 01/18/2022  -     Hemoglobin A1C; Future; Expected date: 01/18/2022  -     Lipid Panel; Future; Expected date: 01/18/2022  -     TSH; Future; Expected date: 01/18/2022  -     Urinalysis; Future; Expected date: 01/18/2022  -     C. trachomatis/N. gonorrhoeae by AMP DNA [facility]; Urine; Future; Expected date: 01/18/2022  -     RPR; Future; Expected date: 01/18/2022  -     HIV 1/2 Ag/Ab (4th Gen); Future; Expected date: 01/18/2022  -     Herpes simplex type 1&2 IgG,Herpes titer; Future; Expected date: 01/18/2022  -     Herpes simplex type 1 & 2 IgM,Herpes IgM; Future; Expected date: 01/18/2022  -     Hepatitis panel, acute; Future; Expected date: 01/18/2022  -     Iron and TIBC; Future; Expected date: 01/18/2022  -     Ferritin; Future; Expected date: 01/18/2022  -     Cancel: COVID-19 (SARS CoV-2) IgG Antibody; Future; Expected date: 01/18/2022  -     Testosterone; Future; Expected date: 01/18/2022    Low libido  -     CBC Auto  Differential; Future; Expected date: 01/18/2022  -     Comprehensive Metabolic Panel; Future; Expected date: 01/18/2022  -     Hemoglobin A1C; Future; Expected date: 01/18/2022  -     Lipid Panel; Future; Expected date: 01/18/2022  -     TSH; Future; Expected date: 01/18/2022  -     Urinalysis; Future; Expected date: 01/18/2022  -     C. trachomatis/N. gonorrhoeae by AMP DNA [facility]; Urine; Future; Expected date: 01/18/2022  -     RPR; Future; Expected date: 01/18/2022  -     HIV 1/2 Ag/Ab (4th Gen); Future; Expected date: 01/18/2022  -     Herpes simplex type 1&2 IgG,Herpes titer; Future; Expected date: 01/18/2022  -     Herpes simplex type 1 & 2 IgM,Herpes IgM; Future; Expected date: 01/18/2022  -     Hepatitis panel, acute; Future; Expected date: 01/18/2022  -     Iron and TIBC; Future; Expected date: 01/18/2022  -     Ferritin; Future; Expected date: 01/18/2022  -     Cancel: COVID-19 (SARS CoV-2) IgG Antibody; Future; Expected date: 01/18/2022  -     Testosterone; Future; Expected date: 01/18/2022    Antibody response exam  -     CBC Auto Differential; Future; Expected date: 01/18/2022  -     Comprehensive Metabolic Panel; Future; Expected date: 01/18/2022  -     Hemoglobin A1C; Future; Expected date: 01/18/2022  -     Lipid Panel; Future; Expected date: 01/18/2022  -     TSH; Future; Expected date: 01/18/2022  -     Urinalysis; Future; Expected date: 01/18/2022  -     C. trachomatis/N. gonorrhoeae by AMP DNA [facility]; Urine; Future; Expected date: 01/18/2022  -     RPR; Future; Expected date: 01/18/2022  -     HIV 1/2 Ag/Ab (4th Gen); Future; Expected date: 01/18/2022  -     Herpes simplex type 1&2 IgG,Herpes titer; Future; Expected date: 01/18/2022  -     Herpes simplex type 1 & 2 IgM,Herpes IgM; Future; Expected date: 01/18/2022  -     Hepatitis panel, acute; Future; Expected date: 01/18/2022  -     Iron and TIBC; Future; Expected date: 01/18/2022  -     Ferritin; Future; Expected date: 01/18/2022  -      Cancel: COVID-19 (SARS CoV-2) IgG Antibody; Future; Expected date: 01/18/2022  -     Testosterone; Future; Expected date: 01/18/2022    Iron deficiency  -     CBC Auto Differential; Future; Expected date: 01/18/2022  -     Comprehensive Metabolic Panel; Future; Expected date: 01/18/2022  -     Hemoglobin A1C; Future; Expected date: 01/18/2022  -     Lipid Panel; Future; Expected date: 01/18/2022  -     TSH; Future; Expected date: 01/18/2022  -     Urinalysis; Future; Expected date: 01/18/2022  -     C. trachomatis/N. gonorrhoeae by AMP DNA [facility]; Urine; Future; Expected date: 01/18/2022  -     RPR; Future; Expected date: 01/18/2022  -     HIV 1/2 Ag/Ab (4th Gen); Future; Expected date: 01/18/2022  -     Herpes simplex type 1&2 IgG,Herpes titer; Future; Expected date: 01/18/2022  -     Herpes simplex type 1 & 2 IgM,Herpes IgM; Future; Expected date: 01/18/2022  -     Hepatitis panel, acute; Future; Expected date: 01/18/2022  -     Iron and TIBC; Future; Expected date: 01/18/2022  -     Ferritin; Future; Expected date: 01/18/2022  -     Cancel: COVID-19 (SARS CoV-2) IgG Antibody; Future; Expected date: 01/18/2022  -     Testosterone; Future; Expected date: 01/18/2022    Screening for diabetes mellitus  -     CBC Auto Differential; Future; Expected date: 01/18/2022  -     Comprehensive Metabolic Panel; Future; Expected date: 01/18/2022  -     Hemoglobin A1C; Future; Expected date: 01/18/2022  -     Lipid Panel; Future; Expected date: 01/18/2022  -     TSH; Future; Expected date: 01/18/2022  -     Urinalysis; Future; Expected date: 01/18/2022  -     C. trachomatis/N. gonorrhoeae by AMP DNA [facility]; Urine; Future; Expected date: 01/18/2022  -     RPR; Future; Expected date: 01/18/2022  -     HIV 1/2 Ag/Ab (4th Gen); Future; Expected date: 01/18/2022  -     Herpes simplex type 1&2 IgG,Herpes titer; Future; Expected date: 01/18/2022  -     Herpes simplex type 1 & 2 IgM,Herpes IgM; Future; Expected date: 01/18/2022  -      Hepatitis panel, acute; Future; Expected date: 01/18/2022  -     Iron and TIBC; Future; Expected date: 01/18/2022  -     Ferritin; Future; Expected date: 01/18/2022  -     Cancel: COVID-19 (SARS CoV-2) IgG Antibody; Future; Expected date: 01/18/2022  -     Testosterone; Future; Expected date: 01/18/2022    Encounter for lipid screening for cardiovascular disease  -     CBC Auto Differential; Future; Expected date: 01/18/2022  -     Comprehensive Metabolic Panel; Future; Expected date: 01/18/2022  -     Hemoglobin A1C; Future; Expected date: 01/18/2022  -     Lipid Panel; Future; Expected date: 01/18/2022  -     TSH; Future; Expected date: 01/18/2022  -     Urinalysis; Future; Expected date: 01/18/2022  -     C. trachomatis/N. gonorrhoeae by AMP DNA [facility]; Urine; Future; Expected date: 01/18/2022  -     RPR; Future; Expected date: 01/18/2022  -     HIV 1/2 Ag/Ab (4th Gen); Future; Expected date: 01/18/2022  -     Herpes simplex type 1&2 IgG,Herpes titer; Future; Expected date: 01/18/2022  -     Herpes simplex type 1 & 2 IgM,Herpes IgM; Future; Expected date: 01/18/2022  -     Hepatitis panel, acute; Future; Expected date: 01/18/2022  -     Iron and TIBC; Future; Expected date: 01/18/2022  -     Ferritin; Future; Expected date: 01/18/2022  -     Cancel: COVID-19 (SARS CoV-2) IgG Antibody; Future; Expected date: 01/18/2022  -     Testosterone; Future; Expected date: 01/18/2022    Screen for STD (sexually transmitted disease)  -     CBC Auto Differential; Future; Expected date: 01/18/2022  -     Comprehensive Metabolic Panel; Future; Expected date: 01/18/2022  -     Hemoglobin A1C; Future; Expected date: 01/18/2022  -     Lipid Panel; Future; Expected date: 01/18/2022  -     TSH; Future; Expected date: 01/18/2022  -     Urinalysis; Future; Expected date: 01/18/2022  -     C. trachomatis/N. gonorrhoeae by AMP DNA [facility]; Urine; Future; Expected date: 01/18/2022  -     RPR; Future; Expected date: 01/18/2022  -     HIV  1/2 Ag/Ab (4th Gen); Future; Expected date: 01/18/2022  -     Herpes simplex type 1&2 IgG,Herpes titer; Future; Expected date: 01/18/2022  -     Herpes simplex type 1 & 2 IgM,Herpes IgM; Future; Expected date: 01/18/2022  -     Hepatitis panel, acute; Future; Expected date: 01/18/2022  -     Iron and TIBC; Future; Expected date: 01/18/2022  -     Ferritin; Future; Expected date: 01/18/2022  -     Cancel: COVID-19 (SARS CoV-2) IgG Antibody; Future; Expected date: 01/18/2022  -     Testosterone; Future; Expected date: 01/18/2022    Other orders  -     tadalafiL (CIALIS) 20 MG Tab; Take 1 tablet (20 mg total) by mouth once daily. for 15 days  Dispense: 15 tablet; Refill: 0         I spent 30 minutes on the day of this encounter for preparing for, evaluating, treating, and managing this patient.        -Continue current medications and maintain follow up with specialists.  Return to clinic in 1 year with Dr. Henry or sooner for any concerns   No follow-ups on file.      Erin Jiminez, NP-C [facility]

## 2022-01-19 LAB
HAV IGM SERPL QL IA: NEGATIVE
HBV CORE IGM SERPL QL IA: NEGATIVE
HBV SURFACE AG SERPL QL IA: NEGATIVE
HCV AB SERPL QL IA: NEGATIVE
HIV 1+2 AB+HIV1 P24 AG SERPL QL IA: NEGATIVE

## 2022-01-20 ENCOUNTER — PATIENT MESSAGE (OUTPATIENT)
Dept: INTERNAL MEDICINE | Facility: CLINIC | Age: 42
End: 2022-01-20
Payer: COMMERCIAL

## 2022-01-21 LAB
HSV AB, IGM BY EIA: 0.62 INDEX
HSV1 IGG SERPL QL IA: POSITIVE
HSV2 IGG SERPL QL IA: NEGATIVE

## 2022-01-25 ENCOUNTER — PATIENT MESSAGE (OUTPATIENT)
Dept: INTERNAL MEDICINE | Facility: CLINIC | Age: 42
End: 2022-01-25
Payer: COMMERCIAL

## 2022-01-25 LAB — RPR SER QL: NORMAL

## 2022-07-29 ENCOUNTER — OFFICE VISIT (OUTPATIENT)
Dept: INTERNAL MEDICINE | Facility: CLINIC | Age: 42
End: 2022-07-29
Payer: COMMERCIAL

## 2022-07-29 VITALS
WEIGHT: 218.06 LBS | DIASTOLIC BLOOD PRESSURE: 72 MMHG | BODY MASS INDEX: 27.25 KG/M2 | HEART RATE: 69 BPM | TEMPERATURE: 98 F | OXYGEN SATURATION: 98 % | SYSTOLIC BLOOD PRESSURE: 118 MMHG

## 2022-07-29 DIAGNOSIS — D50.9 MICROCYTIC ANEMIA: ICD-10-CM

## 2022-07-29 DIAGNOSIS — R53.83 FATIGUE, UNSPECIFIED TYPE: ICD-10-CM

## 2022-07-29 DIAGNOSIS — R55 ATYPICAL SYNCOPE: ICD-10-CM

## 2022-07-29 DIAGNOSIS — F52.0 LACK OF LIBIDO: ICD-10-CM

## 2022-07-29 DIAGNOSIS — Z83.3 FAMILY HISTORY OF DIABETES MELLITUS: ICD-10-CM

## 2022-07-29 DIAGNOSIS — Z13.1 SCREENING FOR DIABETES MELLITUS: ICD-10-CM

## 2022-07-29 DIAGNOSIS — Z13.6 ENCOUNTER FOR LIPID SCREENING FOR CARDIOVASCULAR DISEASE: ICD-10-CM

## 2022-07-29 DIAGNOSIS — E61.1 IRON DEFICIENCY: ICD-10-CM

## 2022-07-29 DIAGNOSIS — E55.9 VITAMIN D INSUFFICIENCY: ICD-10-CM

## 2022-07-29 DIAGNOSIS — Z13.220 ENCOUNTER FOR LIPID SCREENING FOR CARDIOVASCULAR DISEASE: ICD-10-CM

## 2022-07-29 DIAGNOSIS — N52.8 OTHER MALE ERECTILE DYSFUNCTION: ICD-10-CM

## 2022-07-29 DIAGNOSIS — Z11.3 SCREEN FOR STD (SEXUALLY TRANSMITTED DISEASE): ICD-10-CM

## 2022-07-29 DIAGNOSIS — Z00.00 ANNUAL PHYSICAL EXAM: Primary | ICD-10-CM

## 2022-07-29 DIAGNOSIS — Z01.84 ANTIBODY RESPONSE EXAM: ICD-10-CM

## 2022-07-29 PROCEDURE — 3008F BODY MASS INDEX DOCD: CPT | Mod: CPTII,S$GLB,, | Performed by: NURSE PRACTITIONER

## 2022-07-29 PROCEDURE — 3074F PR MOST RECENT SYSTOLIC BLOOD PRESSURE < 130 MM HG: ICD-10-PCS | Mod: CPTII,S$GLB,, | Performed by: NURSE PRACTITIONER

## 2022-07-29 PROCEDURE — 99396 PR PREVENTIVE VISIT,EST,40-64: ICD-10-PCS | Mod: S$GLB,,, | Performed by: NURSE PRACTITIONER

## 2022-07-29 PROCEDURE — 3044F HG A1C LEVEL LT 7.0%: CPT | Mod: CPTII,S$GLB,, | Performed by: NURSE PRACTITIONER

## 2022-07-29 PROCEDURE — 99999 PR PBB SHADOW E&M-EST. PATIENT-LVL III: ICD-10-PCS | Mod: PBBFAC,,, | Performed by: NURSE PRACTITIONER

## 2022-07-29 PROCEDURE — 3074F SYST BP LT 130 MM HG: CPT | Mod: CPTII,S$GLB,, | Performed by: NURSE PRACTITIONER

## 2022-07-29 PROCEDURE — 99396 PREV VISIT EST AGE 40-64: CPT | Mod: S$GLB,,, | Performed by: NURSE PRACTITIONER

## 2022-07-29 PROCEDURE — 3078F PR MOST RECENT DIASTOLIC BLOOD PRESSURE < 80 MM HG: ICD-10-PCS | Mod: CPTII,S$GLB,, | Performed by: NURSE PRACTITIONER

## 2022-07-29 PROCEDURE — 1159F PR MEDICATION LIST DOCUMENTED IN MEDICAL RECORD: ICD-10-PCS | Mod: CPTII,S$GLB,, | Performed by: NURSE PRACTITIONER

## 2022-07-29 PROCEDURE — 1159F MED LIST DOCD IN RCRD: CPT | Mod: CPTII,S$GLB,, | Performed by: NURSE PRACTITIONER

## 2022-07-29 PROCEDURE — 99999 PR PBB SHADOW E&M-EST. PATIENT-LVL III: CPT | Mod: PBBFAC,,, | Performed by: NURSE PRACTITIONER

## 2022-07-29 PROCEDURE — 3044F PR MOST RECENT HEMOGLOBIN A1C LEVEL <7.0%: ICD-10-PCS | Mod: CPTII,S$GLB,, | Performed by: NURSE PRACTITIONER

## 2022-07-29 PROCEDURE — 3078F DIAST BP <80 MM HG: CPT | Mod: CPTII,S$GLB,, | Performed by: NURSE PRACTITIONER

## 2022-07-29 PROCEDURE — 3008F PR BODY MASS INDEX (BMI) DOCUMENTED: ICD-10-PCS | Mod: CPTII,S$GLB,, | Performed by: NURSE PRACTITIONER

## 2022-07-29 RX ORDER — TADALAFIL 20 MG/1
20 TABLET ORAL DAILY
Qty: 30 TABLET | Refills: 1 | Status: SHIPPED | OUTPATIENT
Start: 2022-07-29 | End: 2022-07-29

## 2022-07-29 RX ORDER — TADALAFIL 20 MG/1
20 TABLET ORAL DAILY
Qty: 30 TABLET | Refills: 1 | Status: SHIPPED | OUTPATIENT
Start: 2022-07-29 | End: 2023-01-02 | Stop reason: SDUPTHER

## 2022-07-29 NOTE — PROGRESS NOTES
[facility]    Chief Complaint      Chief Complaint   Patient presents with    Annual Exam     History of Present Illness      Tre Sommer Sr. is a 42 y.o. male patient of Dr. Ibrahim who presents today for annual visit exam.pt feeling well, no complaints, denies sob or cp. Reviewed meds, history, allergies and v/s. No concerns of bowel or bladder function, eats well, stay hydrated and active working out at gym.   Comes to appt alone  Practices safety measures such as wearing his seatbelt    Labs- ordered      Vaccines:    covid- pfizer x 3  Tdap- 2017    Health Maintenance   Topic Date Due    Lipid Panel  01/18/2027    TETANUS VACCINE  07/03/2027    Hepatitis C Screening  Completed       Past Medical History:   Diagnosis Date    Syncope and collapse        Past Surgical History:   Procedure Laterality Date    ADENOIDECTOMY      CIRCUMCISION, PRIMARY      SOFT TISSUE CYST EXCISION      Epidermal Inclusion Cyst    Tonsilectomy      Age 5 or 6    TONSILLECTOMY      VASECTOMY         family history includes Cancer in his maternal grandmother; Diabetes in his father.     Social History     Tobacco Use    Smoking status: Never Smoker    Smokeless tobacco: Never Used   Substance Use Topics    Alcohol use: Yes     Comment: occasional    Drug use: No       Review of Systems   Constitutional: Negative for chills and fever.   HENT: Negative for congestion, hearing loss, sinus pain and sore throat.    Eyes: Negative for blurred vision and discharge.   Respiratory: Negative for cough, shortness of breath and wheezing.    Cardiovascular: Negative for chest pain, palpitations and leg swelling.   Gastrointestinal: Negative for abdominal pain, blood in stool, constipation, diarrhea, nausea and vomiting.   Genitourinary: Negative for dysuria, hematuria and urgency.   Musculoskeletal: Negative for myalgias and neck pain.   Skin: Negative for rash.   Neurological: Negative for dizziness,  weakness and headaches.   Endo/Heme/Allergies: Negative for polydipsia.   Psychiatric/Behavioral: Negative for depression. The patient is not nervous/anxious.         Outpatient Encounter Medications as of 7/29/2022   Medication Sig Note Dispense Refill    multivitamin with minerals tablet Take 1 tablet by mouth once daily. 10/7/2015: Hold am of surgery      tadalafiL (CIALIS) 20 MG Tab Take 1 tablet (20 mg total) by mouth once daily.  30 tablet 1    [DISCONTINUED] tadalafiL (CIALIS) 20 MG Tab Take 1 tablet (20 mg total) by mouth once daily. for 15 days  15 tablet 0     No facility-administered encounter medications on file as of 7/29/2022.       Review of patient's allergies indicates:  No Known Allergies    Physical Exam      Vital Signs  Temp: 98.1 °F (36.7 °C)  Pulse: 69  SpO2: 98 %  BP: 118/72  Height and Weight  Weight: 98.9 kg (218 lb 0.6 oz)    Physical Exam  Vitals and nursing note reviewed.   Constitutional:       General: He is not in acute distress.     Appearance: Normal appearance. He is well-developed and normal weight. He is not ill-appearing.   HENT:      Head: Normocephalic and atraumatic.      Right Ear: External ear normal.      Left Ear: External ear normal.   Eyes:      Conjunctiva/sclera: Conjunctivae normal.      Pupils: Pupils are equal, round, and reactive to light.   Neck:      Thyroid: No thyromegaly.      Vascular: No JVD.      Trachea: No tracheal deviation.   Cardiovascular:      Rate and Rhythm: Normal rate and regular rhythm.      Heart sounds: Normal heart sounds.   Pulmonary:      Effort: Pulmonary effort is normal. No respiratory distress.      Breath sounds: Normal breath sounds.   Abdominal:      General: Bowel sounds are normal. There is no distension.      Palpations: Abdomen is soft.      Tenderness: There is no abdominal tenderness.   Musculoskeletal:         General: Normal range of motion.      Cervical back: Normal range of motion and neck supple.   Lymphadenopathy:       Cervical: No cervical adenopathy.   Skin:     General: Skin is warm and dry.      Coloration: Skin is not pale.      Findings: No erythema or rash.   Neurological:      General: No focal deficit present.      Mental Status: He is alert and oriented to person, place, and time.   Psychiatric:         Mood and Affect: Mood normal.         Behavior: Behavior normal.         Thought Content: Thought content normal.         Judgment: Judgment normal.          Laboratory:  CBC:  Lab Results   Component Value Date    WBC 3.79 (L) 01/18/2022    RBC 5.81 01/18/2022    HGB 13.9 (L) 01/18/2022    HCT 44.9 01/18/2022     01/18/2022    MCV 77 (L) 01/18/2022    MCH 23.9 (L) 01/18/2022    MCHC 31.0 (L) 01/18/2022    MCHC 30.8 (L) 10/16/2020    MCHC 30.0 (L) 11/11/2019     CMP:  Lab Results   Component Value Date    GLU 86 01/18/2022    CALCIUM 9.9 01/18/2022    ALBUMIN 4.7 01/18/2022    PROT 7.7 01/18/2022     01/18/2022    K 4.1 01/18/2022    CO2 25 01/18/2022     01/18/2022    BUN 9 01/18/2022    ALKPHOS 49 (L) 01/18/2022    ALT 20 01/18/2022    AST 25 01/18/2022    BILITOT 0.9 01/18/2022    BILITOT 0.5 10/16/2020    BILITOT 0.6 11/11/2019     URINALYSIS:  Lab Results   Component Value Date    COLORU Yellow 01/18/2022    SPECGRAV 1.015 01/18/2022    PHUR 7.0 01/18/2022    PROTEINUA Negative 01/18/2022    NITRITE Negative 01/18/2022    LEUKOCYTESUR Negative 01/18/2022    UROBILINOGEN Negative 08/13/2016      LIPIDS:  Lab Results   Component Value Date    TSH 1.540 01/18/2022    TSH 1.220 10/16/2020    TSH 1.435 11/11/2019    HDL 83 (H) 01/18/2022    HDL 89 (H) 10/16/2020    HDL 76 (H) 11/11/2019    CHOL 194 01/18/2022    CHOL 208 (H) 10/16/2020    CHOL 197 11/11/2019    TRIG 53 01/18/2022    TRIG 72 10/16/2020    TRIG 75 11/11/2019    LDLCALC 100.4 01/18/2022    LDLCALC 104.6 10/16/2020    LDLCALC 106.0 11/11/2019    CHOLHDL 42.8 01/18/2022    CHOLHDL 42.8 10/16/2020    CHOLHDL 38.6 11/11/2019    NONHDLCHOL  111 01/18/2022    NONHDLCHOL 119 10/16/2020    NONHDLCHOL 121 11/11/2019    TOTALCHOLEST 2.3 01/18/2022    TOTALCHOLEST 2.3 10/16/2020    TOTALCHOLEST 2.6 11/11/2019     TSH:  Lab Results   Component Value Date    TSH 1.540 01/18/2022    TSH 1.220 10/16/2020    TSH 1.435 11/11/2019     A1C:  Lab Results   Component Value Date    HGBA1C 5.4 01/18/2022    HGBA1C 5.4 10/16/2020    HGBA1C 5.4 11/11/2019    HGBA1C 5.6 07/03/2017    HGBA1C 5.5 04/01/2015    HGBA1C 5.6 07/01/2013         Assessment/Plan     Tre Sommer Sr. is a 42 y.o.male with:    Annual physical exam  -     CBC Auto Differential; Future; Expected date: 07/29/2022  -     Comprehensive Metabolic Panel; Future; Expected date: 07/29/2022  -     Vitamin D; Future; Expected date: 07/29/2022  -     C. trachomatis/N. gonorrhoeae by AMP DNA [facility]; Urine; Future; Expected date: 07/29/2022  -     RPR; Future; Expected date: 07/29/2022  -     HIV 1/2 Ag/Ab (4th Gen); Future; Expected date: 07/29/2022  -     Herpes simplex type 1&2 IgG,Herpes titer; Future; Expected date: 07/29/2022  -     Herpes simplex type 1 & 2 IgM,Herpes IgM; Future; Expected date: 07/29/2022  -     Hepatitis panel, acute; Future; Expected date: 07/29/2022  -     TSH; Future; Expected date: 07/29/2022  -     Lipid Panel; Future; Expected date: 07/29/2022  -     Hemoglobin A1C; Future; Expected date: 07/29/2022  -     Iron and TIBC; Future; Expected date: 07/29/2022  -     Ferritin; Future; Expected date: 07/29/2022  -     Testosterone; Future; Expected date: 07/29/2022    Atypical syncope  -     CBC Auto Differential; Future; Expected date: 07/29/2022  -     Comprehensive Metabolic Panel; Future; Expected date: 07/29/2022  -     Vitamin D; Future; Expected date: 07/29/2022  -     C. trachomatis/N. gonorrhoeae by AMP DNA [facility]; Urine; Future; Expected date: 07/29/2022  -     RPR; Future; Expected date: 07/29/2022  -     HIV 1/2 Ag/Ab (4th Gen); Future; Expected date: 07/29/2022  -      Herpes simplex type 1&2 IgG,Herpes titer; Future; Expected date: 07/29/2022  -     Herpes simplex type 1 & 2 IgM,Herpes IgM; Future; Expected date: 07/29/2022  -     Hepatitis panel, acute; Future; Expected date: 07/29/2022  -     TSH; Future; Expected date: 07/29/2022  -     Lipid Panel; Future; Expected date: 07/29/2022  -     Hemoglobin A1C; Future; Expected date: 07/29/2022  -     Iron and TIBC; Future; Expected date: 07/29/2022  -     Ferritin; Future; Expected date: 07/29/2022  -     Testosterone; Future; Expected date: 07/29/2022    Antibody response exam  -     CBC Auto Differential; Future; Expected date: 07/29/2022  -     Comprehensive Metabolic Panel; Future; Expected date: 07/29/2022  -     Vitamin D; Future; Expected date: 07/29/2022  -     C. trachomatis/N. gonorrhoeae by AMP DNA [facility]; Urine; Future; Expected date: 07/29/2022  -     RPR; Future; Expected date: 07/29/2022  -     HIV 1/2 Ag/Ab (4th Gen); Future; Expected date: 07/29/2022  -     Herpes simplex type 1&2 IgG,Herpes titer; Future; Expected date: 07/29/2022  -     Herpes simplex type 1 & 2 IgM,Herpes IgM; Future; Expected date: 07/29/2022  -     Hepatitis panel, acute; Future; Expected date: 07/29/2022  -     TSH; Future; Expected date: 07/29/2022  -     Lipid Panel; Future; Expected date: 07/29/2022  -     Hemoglobin A1C; Future; Expected date: 07/29/2022  -     Iron and TIBC; Future; Expected date: 07/29/2022  -     Ferritin; Future; Expected date: 07/29/2022  -     Testosterone; Future; Expected date: 07/29/2022    Iron deficiency  -     CBC Auto Differential; Future; Expected date: 07/29/2022  -     Comprehensive Metabolic Panel; Future; Expected date: 07/29/2022  -     Vitamin D; Future; Expected date: 07/29/2022  -     C. trachomatis/N. gonorrhoeae by AMP DNA [facility]; Urine; Future; Expected date: 07/29/2022  -     RPR; Future; Expected date: 07/29/2022  -     HIV 1/2 Ag/Ab (4th Gen); Future; Expected date: 07/29/2022  -     Herpes  simplex type 1&2 IgG,Herpes titer; Future; Expected date: 07/29/2022  -     Herpes simplex type 1 & 2 IgM,Herpes IgM; Future; Expected date: 07/29/2022  -     Hepatitis panel, acute; Future; Expected date: 07/29/2022  -     TSH; Future; Expected date: 07/29/2022  -     Lipid Panel; Future; Expected date: 07/29/2022  -     Hemoglobin A1C; Future; Expected date: 07/29/2022  -     Iron and TIBC; Future; Expected date: 07/29/2022  -     Ferritin; Future; Expected date: 07/29/2022  -     Testosterone; Future; Expected date: 07/29/2022    Screening for diabetes mellitus  -     CBC Auto Differential; Future; Expected date: 07/29/2022  -     Comprehensive Metabolic Panel; Future; Expected date: 07/29/2022  -     Vitamin D; Future; Expected date: 07/29/2022  -     C. trachomatis/N. gonorrhoeae by AMP DNA [facility]; Urine; Future; Expected date: 07/29/2022  -     RPR; Future; Expected date: 07/29/2022  -     HIV 1/2 Ag/Ab (4th Gen); Future; Expected date: 07/29/2022  -     Herpes simplex type 1&2 IgG,Herpes titer; Future; Expected date: 07/29/2022  -     Herpes simplex type 1 & 2 IgM,Herpes IgM; Future; Expected date: 07/29/2022  -     Hepatitis panel, acute; Future; Expected date: 07/29/2022  -     TSH; Future; Expected date: 07/29/2022  -     Lipid Panel; Future; Expected date: 07/29/2022  -     Hemoglobin A1C; Future; Expected date: 07/29/2022  -     Iron and TIBC; Future; Expected date: 07/29/2022  -     Ferritin; Future; Expected date: 07/29/2022  -     Testosterone; Future; Expected date: 07/29/2022    Encounter for lipid screening for cardiovascular disease  -     CBC Auto Differential; Future; Expected date: 07/29/2022  -     Comprehensive Metabolic Panel; Future; Expected date: 07/29/2022  -     Vitamin D; Future; Expected date: 07/29/2022  -     C. trachomatis/N. gonorrhoeae by AMP DNA [facility]; Urine; Future; Expected date: 07/29/2022  -     RPR; Future; Expected date: 07/29/2022  -     HIV 1/2 Ag/Ab (4th Gen);  Future; Expected date: 07/29/2022  -     Herpes simplex type 1&2 IgG,Herpes titer; Future; Expected date: 07/29/2022  -     Herpes simplex type 1 & 2 IgM,Herpes IgM; Future; Expected date: 07/29/2022  -     Hepatitis panel, acute; Future; Expected date: 07/29/2022  -     TSH; Future; Expected date: 07/29/2022  -     Lipid Panel; Future; Expected date: 07/29/2022  -     Hemoglobin A1C; Future; Expected date: 07/29/2022  -     Iron and TIBC; Future; Expected date: 07/29/2022  -     Ferritin; Future; Expected date: 07/29/2022  -     Testosterone; Future; Expected date: 07/29/2022    Screen for STD (sexually transmitted disease)  -     CBC Auto Differential; Future; Expected date: 07/29/2022  -     Comprehensive Metabolic Panel; Future; Expected date: 07/29/2022  -     Vitamin D; Future; Expected date: 07/29/2022  -     C. trachomatis/N. gonorrhoeae by AMP DNA [facility]; Urine; Future; Expected date: 07/29/2022  -     RPR; Future; Expected date: 07/29/2022  -     HIV 1/2 Ag/Ab (4th Gen); Future; Expected date: 07/29/2022  -     Herpes simplex type 1&2 IgG,Herpes titer; Future; Expected date: 07/29/2022  -     Herpes simplex type 1 & 2 IgM,Herpes IgM; Future; Expected date: 07/29/2022  -     Hepatitis panel, acute; Future; Expected date: 07/29/2022  -     TSH; Future; Expected date: 07/29/2022  -     Lipid Panel; Future; Expected date: 07/29/2022  -     Hemoglobin A1C; Future; Expected date: 07/29/2022  -     Iron and TIBC; Future; Expected date: 07/29/2022  -     Ferritin; Future; Expected date: 07/29/2022  -     Testosterone; Future; Expected date: 07/29/2022    Other male erectile dysfunction  -     CBC Auto Differential; Future; Expected date: 07/29/2022  -     Comprehensive Metabolic Panel; Future; Expected date: 07/29/2022  -     Vitamin D; Future; Expected date: 07/29/2022  -     C. trachomatis/N. gonorrhoeae by AMP DNA [facility]; Urine; Future; Expected date: 07/29/2022  -     RPR; Future; Expected date:  07/29/2022  -     HIV 1/2 Ag/Ab (4th Gen); Future; Expected date: 07/29/2022  -     Herpes simplex type 1&2 IgG,Herpes titer; Future; Expected date: 07/29/2022  -     Herpes simplex type 1 & 2 IgM,Herpes IgM; Future; Expected date: 07/29/2022  -     Hepatitis panel, acute; Future; Expected date: 07/29/2022  -     TSH; Future; Expected date: 07/29/2022  -     Lipid Panel; Future; Expected date: 07/29/2022  -     Hemoglobin A1C; Future; Expected date: 07/29/2022  -     Iron and TIBC; Future; Expected date: 07/29/2022  -     Ferritin; Future; Expected date: 07/29/2022  -     Testosterone; Future; Expected date: 07/29/2022  -     tadalafiL (CIALIS) 20 MG Tab; Take 1 tablet (20 mg total) by mouth once daily.  Dispense: 30 tablet; Refill: 1    Lack of libido  -     CBC Auto Differential; Future; Expected date: 07/29/2022  -     Comprehensive Metabolic Panel; Future; Expected date: 07/29/2022  -     Vitamin D; Future; Expected date: 07/29/2022  -     C. trachomatis/N. gonorrhoeae by AMP DNA [facility]; Urine; Future; Expected date: 07/29/2022  -     RPR; Future; Expected date: 07/29/2022  -     HIV 1/2 Ag/Ab (4th Gen); Future; Expected date: 07/29/2022  -     Herpes simplex type 1&2 IgG,Herpes titer; Future; Expected date: 07/29/2022  -     Herpes simplex type 1 & 2 IgM,Herpes IgM; Future; Expected date: 07/29/2022  -     Hepatitis panel, acute; Future; Expected date: 07/29/2022  -     TSH; Future; Expected date: 07/29/2022  -     Lipid Panel; Future; Expected date: 07/29/2022  -     Hemoglobin A1C; Future; Expected date: 07/29/2022  -     Iron and TIBC; Future; Expected date: 07/29/2022  -     Ferritin; Future; Expected date: 07/29/2022  -     Testosterone; Future; Expected date: 07/29/2022  -     tadalafiL (CIALIS) 20 MG Tab; Take 1 tablet (20 mg total) by mouth once daily.  Dispense: 30 tablet; Refill: 1    Microcytic anemia  -     CBC Auto Differential; Future; Expected date: 07/29/2022  -     Comprehensive Metabolic  Panel; Future; Expected date: 07/29/2022  -     Vitamin D; Future; Expected date: 07/29/2022  -     C. trachomatis/N. gonorrhoeae by AMP DNA [facility]; Urine; Future; Expected date: 07/29/2022  -     RPR; Future; Expected date: 07/29/2022  -     HIV 1/2 Ag/Ab (4th Gen); Future; Expected date: 07/29/2022  -     Herpes simplex type 1&2 IgG,Herpes titer; Future; Expected date: 07/29/2022  -     Herpes simplex type 1 & 2 IgM,Herpes IgM; Future; Expected date: 07/29/2022  -     Hepatitis panel, acute; Future; Expected date: 07/29/2022  -     TSH; Future; Expected date: 07/29/2022  -     Lipid Panel; Future; Expected date: 07/29/2022  -     Hemoglobin A1C; Future; Expected date: 07/29/2022  -     Iron and TIBC; Future; Expected date: 07/29/2022  -     Ferritin; Future; Expected date: 07/29/2022  -     Testosterone; Future; Expected date: 07/29/2022    Family history of diabetes mellitus  -     CBC Auto Differential; Future; Expected date: 07/29/2022  -     Comprehensive Metabolic Panel; Future; Expected date: 07/29/2022  -     Vitamin D; Future; Expected date: 07/29/2022  -     C. trachomatis/N. gonorrhoeae by AMP DNA [facility]; Urine; Future; Expected date: 07/29/2022  -     RPR; Future; Expected date: 07/29/2022  -     HIV 1/2 Ag/Ab (4th Gen); Future; Expected date: 07/29/2022  -     Herpes simplex type 1&2 IgG,Herpes titer; Future; Expected date: 07/29/2022  -     Herpes simplex type 1 & 2 IgM,Herpes IgM; Future; Expected date: 07/29/2022  -     Hepatitis panel, acute; Future; Expected date: 07/29/2022  -     TSH; Future; Expected date: 07/29/2022  -     Lipid Panel; Future; Expected date: 07/29/2022  -     Hemoglobin A1C; Future; Expected date: 07/29/2022  -     Iron and TIBC; Future; Expected date: 07/29/2022  -     Ferritin; Future; Expected date: 07/29/2022  -     Testosterone; Future; Expected date: 07/29/2022    Fatigue, unspecified type  -     CBC Auto Differential; Future; Expected date: 07/29/2022  -      Comprehensive Metabolic Panel; Future; Expected date: 07/29/2022  -     Vitamin D; Future; Expected date: 07/29/2022  -     C. trachomatis/N. gonorrhoeae by AMP DNA [facility]; Urine; Future; Expected date: 07/29/2022  -     RPR; Future; Expected date: 07/29/2022  -     HIV 1/2 Ag/Ab (4th Gen); Future; Expected date: 07/29/2022  -     Herpes simplex type 1&2 IgG,Herpes titer; Future; Expected date: 07/29/2022  -     Herpes simplex type 1 & 2 IgM,Herpes IgM; Future; Expected date: 07/29/2022  -     Hepatitis panel, acute; Future; Expected date: 07/29/2022  -     TSH; Future; Expected date: 07/29/2022  -     Lipid Panel; Future; Expected date: 07/29/2022  -     Hemoglobin A1C; Future; Expected date: 07/29/2022  -     Iron and TIBC; Future; Expected date: 07/29/2022  -     Ferritin; Future; Expected date: 07/29/2022  -     Testosterone; Future; Expected date: 07/29/2022    Vitamin D insufficiency  -     CBC Auto Differential; Future; Expected date: 07/29/2022  -     Comprehensive Metabolic Panel; Future; Expected date: 07/29/2022  -     Vitamin D; Future; Expected date: 07/29/2022  -     C. trachomatis/N. gonorrhoeae by AMP DNA [facility]; Urine; Future; Expected date: 07/29/2022  -     RPR; Future; Expected date: 07/29/2022  -     HIV 1/2 Ag/Ab (4th Gen); Future; Expected date: 07/29/2022  -     Herpes simplex type 1&2 IgG,Herpes titer; Future; Expected date: 07/29/2022  -     Herpes simplex type 1 & 2 IgM,Herpes IgM; Future; Expected date: 07/29/2022  -     Hepatitis panel, acute; Future; Expected date: 07/29/2022  -     TSH; Future; Expected date: 07/29/2022  -     Lipid Panel; Future; Expected date: 07/29/2022  -     Hemoglobin A1C; Future; Expected date: 07/29/2022  -     Iron and TIBC; Future; Expected date: 07/29/2022  -     Ferritin; Future; Expected date: 07/29/2022  -     Testosterone; Future; Expected date: 07/29/2022         I spent 40 minutes on the day of this encounter for preparing for, evaluating, treating,  and managing this patient.        -Continue current medications and maintain follow up with specialists.  Return to clinic in 1 year or sooner for any concerns   No follow-ups on file.      Erin Jiminez, NP-C [facility] [place]

## 2022-08-01 ENCOUNTER — LAB VISIT (OUTPATIENT)
Dept: LAB | Facility: HOSPITAL | Age: 42
End: 2022-08-01
Attending: NURSE PRACTITIONER
Payer: COMMERCIAL

## 2022-08-01 DIAGNOSIS — Z13.220 ENCOUNTER FOR LIPID SCREENING FOR CARDIOVASCULAR DISEASE: ICD-10-CM

## 2022-08-01 DIAGNOSIS — E55.9 VITAMIN D INSUFFICIENCY: ICD-10-CM

## 2022-08-01 DIAGNOSIS — F52.0 LACK OF LIBIDO: ICD-10-CM

## 2022-08-01 DIAGNOSIS — Z01.84 ANTIBODY RESPONSE EXAM: ICD-10-CM

## 2022-08-01 DIAGNOSIS — Z83.3 FAMILY HISTORY OF DIABETES MELLITUS: ICD-10-CM

## 2022-08-01 DIAGNOSIS — Z11.3 SCREEN FOR STD (SEXUALLY TRANSMITTED DISEASE): ICD-10-CM

## 2022-08-01 DIAGNOSIS — R53.83 FATIGUE, UNSPECIFIED TYPE: ICD-10-CM

## 2022-08-01 DIAGNOSIS — R55 ATYPICAL SYNCOPE: ICD-10-CM

## 2022-08-01 DIAGNOSIS — E61.1 IRON DEFICIENCY: ICD-10-CM

## 2022-08-01 DIAGNOSIS — Z13.1 SCREENING FOR DIABETES MELLITUS: ICD-10-CM

## 2022-08-01 DIAGNOSIS — D50.9 MICROCYTIC ANEMIA: ICD-10-CM

## 2022-08-01 DIAGNOSIS — Z13.6 ENCOUNTER FOR LIPID SCREENING FOR CARDIOVASCULAR DISEASE: ICD-10-CM

## 2022-08-01 DIAGNOSIS — N52.8 OTHER MALE ERECTILE DYSFUNCTION: ICD-10-CM

## 2022-08-01 DIAGNOSIS — Z00.00 ANNUAL PHYSICAL EXAM: ICD-10-CM

## 2022-08-01 LAB
25(OH)D3+25(OH)D2 SERPL-MCNC: 28 NG/ML (ref 30–96)
ALBUMIN SERPL BCP-MCNC: 4.3 G/DL (ref 3.5–5.2)
ALP SERPL-CCNC: 45 U/L (ref 55–135)
ALT SERPL W/O P-5'-P-CCNC: 19 U/L (ref 10–44)
ANION GAP SERPL CALC-SCNC: 8 MMOL/L (ref 8–16)
AST SERPL-CCNC: 19 U/L (ref 10–40)
BASOPHILS # BLD AUTO: 0.05 K/UL (ref 0–0.2)
BASOPHILS NFR BLD: 1.4 % (ref 0–1.9)
BILIRUB SERPL-MCNC: 0.6 MG/DL (ref 0.1–1)
BUN SERPL-MCNC: 12 MG/DL (ref 6–20)
CALCIUM SERPL-MCNC: 9.4 MG/DL (ref 8.7–10.5)
CHLORIDE SERPL-SCNC: 102 MMOL/L (ref 95–110)
CHOLEST SERPL-MCNC: 197 MG/DL (ref 120–199)
CHOLEST/HDLC SERPL: 2.6 {RATIO} (ref 2–5)
CO2 SERPL-SCNC: 28 MMOL/L (ref 23–29)
CREAT SERPL-MCNC: 1.1 MG/DL (ref 0.5–1.4)
DIFFERENTIAL METHOD: ABNORMAL
EOSINOPHIL # BLD AUTO: 0.1 K/UL (ref 0–0.5)
EOSINOPHIL NFR BLD: 3.5 % (ref 0–8)
ERYTHROCYTE [DISTWIDTH] IN BLOOD BY AUTOMATED COUNT: 14.3 % (ref 11.5–14.5)
EST. GFR  (NO RACE VARIABLE): >60 ML/MIN/1.73 M^2
ESTIMATED AVG GLUCOSE: 114 MG/DL (ref 68–131)
FERRITIN SERPL-MCNC: 93 NG/ML (ref 20–300)
GLUCOSE SERPL-MCNC: 88 MG/DL (ref 70–110)
HBA1C MFR BLD: 5.6 % (ref 4–5.6)
HCT VFR BLD AUTO: 42.5 % (ref 40–54)
HDLC SERPL-MCNC: 77 MG/DL (ref 40–75)
HDLC SERPL: 39.1 % (ref 20–50)
HGB BLD-MCNC: 13.4 G/DL (ref 14–18)
IMM GRANULOCYTES # BLD AUTO: 0 K/UL (ref 0–0.04)
IMM GRANULOCYTES NFR BLD AUTO: 0 % (ref 0–0.5)
IRON SERPL-MCNC: 85 UG/DL (ref 45–160)
LDLC SERPL CALC-MCNC: 106 MG/DL (ref 63–159)
LYMPHOCYTES # BLD AUTO: 1.1 K/UL (ref 1–4.8)
LYMPHOCYTES NFR BLD: 31 % (ref 18–48)
MCH RBC QN AUTO: 23.8 PG (ref 27–31)
MCHC RBC AUTO-ENTMCNC: 31.5 G/DL (ref 32–36)
MCV RBC AUTO: 75 FL (ref 82–98)
MONOCYTES # BLD AUTO: 0.4 K/UL (ref 0.3–1)
MONOCYTES NFR BLD: 12.2 % (ref 4–15)
NEUTROPHILS # BLD AUTO: 1.8 K/UL (ref 1.8–7.7)
NEUTROPHILS NFR BLD: 51.9 % (ref 38–73)
NONHDLC SERPL-MCNC: 120 MG/DL
NRBC BLD-RTO: 0 /100 WBC
PLATELET # BLD AUTO: 252 K/UL (ref 150–450)
PMV BLD AUTO: 9.7 FL (ref 9.2–12.9)
POTASSIUM SERPL-SCNC: 4.3 MMOL/L (ref 3.5–5.1)
PROT SERPL-MCNC: 7.2 G/DL (ref 6–8.4)
RBC # BLD AUTO: 5.64 M/UL (ref 4.6–6.2)
SATURATED IRON: 20 % (ref 20–50)
SODIUM SERPL-SCNC: 138 MMOL/L (ref 136–145)
TESTOST SERPL-MCNC: 598 NG/DL (ref 304–1227)
TOTAL IRON BINDING CAPACITY: 435 UG/DL (ref 250–450)
TRANSFERRIN SERPL-MCNC: 294 MG/DL (ref 200–375)
TRIGL SERPL-MCNC: 70 MG/DL (ref 30–150)
TSH SERPL DL<=0.005 MIU/L-ACNC: 1.95 UIU/ML (ref 0.4–4)
WBC # BLD AUTO: 3.45 K/UL (ref 3.9–12.7)

## 2022-08-01 PROCEDURE — 86694 HERPES SIMPLEX NES ANTBDY: CPT | Performed by: NURSE PRACTITIONER

## 2022-08-01 PROCEDURE — 84466 ASSAY OF TRANSFERRIN: CPT | Performed by: NURSE PRACTITIONER

## 2022-08-01 PROCEDURE — 80061 LIPID PANEL: CPT | Performed by: NURSE PRACTITIONER

## 2022-08-01 PROCEDURE — 80074 ACUTE HEPATITIS PANEL: CPT | Performed by: NURSE PRACTITIONER

## 2022-08-01 PROCEDURE — 85025 COMPLETE CBC W/AUTO DIFF WBC: CPT | Performed by: NURSE PRACTITIONER

## 2022-08-01 PROCEDURE — 84403 ASSAY OF TOTAL TESTOSTERONE: CPT | Performed by: NURSE PRACTITIONER

## 2022-08-01 PROCEDURE — 84443 ASSAY THYROID STIM HORMONE: CPT | Performed by: NURSE PRACTITIONER

## 2022-08-01 PROCEDURE — 86695 HERPES SIMPLEX TYPE 1 TEST: CPT | Performed by: NURSE PRACTITIONER

## 2022-08-01 PROCEDURE — 82728 ASSAY OF FERRITIN: CPT | Performed by: NURSE PRACTITIONER

## 2022-08-01 PROCEDURE — 86696 HERPES SIMPLEX TYPE 2 TEST: CPT | Performed by: NURSE PRACTITIONER

## 2022-08-01 PROCEDURE — 83036 HEMOGLOBIN GLYCOSYLATED A1C: CPT | Performed by: NURSE PRACTITIONER

## 2022-08-01 PROCEDURE — 36415 COLL VENOUS BLD VENIPUNCTURE: CPT | Performed by: NURSE PRACTITIONER

## 2022-08-01 PROCEDURE — 87389 HIV-1 AG W/HIV-1&-2 AB AG IA: CPT | Performed by: NURSE PRACTITIONER

## 2022-08-01 PROCEDURE — 80053 COMPREHEN METABOLIC PANEL: CPT | Performed by: NURSE PRACTITIONER

## 2022-08-01 PROCEDURE — 82306 VITAMIN D 25 HYDROXY: CPT | Performed by: NURSE PRACTITIONER

## 2022-08-01 PROCEDURE — 86592 SYPHILIS TEST NON-TREP QUAL: CPT | Performed by: NURSE PRACTITIONER

## 2022-08-02 LAB
HIV 1+2 AB+HIV1 P24 AG SERPL QL IA: NEGATIVE
HSV1 IGG SERPL QL IA: POSITIVE
HSV2 IGG SERPL QL IA: NEGATIVE
RPR SER QL: NORMAL

## 2022-08-03 LAB
HAV IGM SERPL QL IA: NEGATIVE
HBV CORE IGM SERPL QL IA: NEGATIVE
HBV SURFACE AG SERPL QL IA: NEGATIVE
HCV AB SERPL QL IA: NEGATIVE

## 2022-08-05 ENCOUNTER — PATIENT MESSAGE (OUTPATIENT)
Dept: INTERNAL MEDICINE | Facility: CLINIC | Age: 42
End: 2022-08-05
Payer: COMMERCIAL

## 2022-08-05 LAB — HSV AB, IGM BY EIA: 1.08 INDEX

## 2022-08-08 ENCOUNTER — PATIENT MESSAGE (OUTPATIENT)
Dept: INTERNAL MEDICINE | Facility: CLINIC | Age: 42
End: 2022-08-08
Payer: COMMERCIAL

## 2022-09-21 ENCOUNTER — OFFICE VISIT (OUTPATIENT)
Dept: URGENT CARE | Facility: CLINIC | Age: 42
End: 2022-09-21
Payer: COMMERCIAL

## 2022-09-21 VITALS
HEART RATE: 85 BPM | OXYGEN SATURATION: 98 % | WEIGHT: 209 LBS | DIASTOLIC BLOOD PRESSURE: 81 MMHG | TEMPERATURE: 99 F | RESPIRATION RATE: 18 BRPM | SYSTOLIC BLOOD PRESSURE: 131 MMHG | HEIGHT: 75 IN | BODY MASS INDEX: 25.99 KG/M2

## 2022-09-21 DIAGNOSIS — M54.50 ACUTE LEFT-SIDED LOW BACK PAIN WITHOUT SCIATICA: Primary | ICD-10-CM

## 2022-09-21 LAB
BILIRUB UR QL STRIP: NEGATIVE
GLUCOSE UR QL STRIP: NEGATIVE
KETONES UR QL STRIP: NEGATIVE
LEUKOCYTE ESTERASE UR QL STRIP: NEGATIVE
PH, POC UA: 8 (ref 5–8)
POC BLOOD, URINE: NEGATIVE
POC NITRATES, URINE: NEGATIVE
PROT UR QL STRIP: NEGATIVE
SP GR UR STRIP: 1.01 (ref 1–1.03)
UROBILINOGEN UR STRIP-ACNC: NORMAL (ref 0.3–2.2)

## 2022-09-21 PROCEDURE — 3044F HG A1C LEVEL LT 7.0%: CPT | Mod: CPTII,S$GLB,, | Performed by: SURGERY

## 2022-09-21 PROCEDURE — 3075F PR MOST RECENT SYSTOLIC BLOOD PRESS GE 130-139MM HG: ICD-10-PCS | Mod: CPTII,S$GLB,, | Performed by: SURGERY

## 2022-09-21 PROCEDURE — 71046 XR CHEST PA AND LATERAL: ICD-10-PCS | Mod: S$GLB,,, | Performed by: RADIOLOGY

## 2022-09-21 PROCEDURE — 81003 URINALYSIS AUTO W/O SCOPE: CPT | Mod: QW,S$GLB,, | Performed by: SURGERY

## 2022-09-21 PROCEDURE — 3079F PR MOST RECENT DIASTOLIC BLOOD PRESSURE 80-89 MM HG: ICD-10-PCS | Mod: CPTII,S$GLB,, | Performed by: SURGERY

## 2022-09-21 PROCEDURE — 3079F DIAST BP 80-89 MM HG: CPT | Mod: CPTII,S$GLB,, | Performed by: SURGERY

## 2022-09-21 PROCEDURE — 1160F RVW MEDS BY RX/DR IN RCRD: CPT | Mod: CPTII,S$GLB,, | Performed by: SURGERY

## 2022-09-21 PROCEDURE — 3008F BODY MASS INDEX DOCD: CPT | Mod: CPTII,S$GLB,, | Performed by: SURGERY

## 2022-09-21 PROCEDURE — 81003 POCT URINALYSIS, DIPSTICK, AUTOMATED, W/O SCOPE: ICD-10-PCS | Mod: QW,S$GLB,, | Performed by: SURGERY

## 2022-09-21 PROCEDURE — 3075F SYST BP GE 130 - 139MM HG: CPT | Mod: CPTII,S$GLB,, | Performed by: SURGERY

## 2022-09-21 PROCEDURE — 3044F PR MOST RECENT HEMOGLOBIN A1C LEVEL <7.0%: ICD-10-PCS | Mod: CPTII,S$GLB,, | Performed by: SURGERY

## 2022-09-21 PROCEDURE — 99214 PR OFFICE/OUTPT VISIT, EST, LEVL IV, 30-39 MIN: ICD-10-PCS | Mod: S$GLB,,, | Performed by: SURGERY

## 2022-09-21 PROCEDURE — 1159F MED LIST DOCD IN RCRD: CPT | Mod: CPTII,S$GLB,, | Performed by: SURGERY

## 2022-09-21 PROCEDURE — 71046 X-RAY EXAM CHEST 2 VIEWS: CPT | Mod: S$GLB,,, | Performed by: RADIOLOGY

## 2022-09-21 PROCEDURE — 1160F PR REVIEW ALL MEDS BY PRESCRIBER/CLIN PHARMACIST DOCUMENTED: ICD-10-PCS | Mod: CPTII,S$GLB,, | Performed by: SURGERY

## 2022-09-21 PROCEDURE — 1159F PR MEDICATION LIST DOCUMENTED IN MEDICAL RECORD: ICD-10-PCS | Mod: CPTII,S$GLB,, | Performed by: SURGERY

## 2022-09-21 PROCEDURE — 3008F PR BODY MASS INDEX (BMI) DOCUMENTED: ICD-10-PCS | Mod: CPTII,S$GLB,, | Performed by: SURGERY

## 2022-09-21 PROCEDURE — 99214 OFFICE O/P EST MOD 30 MIN: CPT | Mod: S$GLB,,, | Performed by: SURGERY

## 2022-09-21 NOTE — PROGRESS NOTES
"Subjective:       Patient ID: Tre Sommer Sr. is a 42 y.o. male.    Vitals:  height is 6' 3" (1.905 m) and weight is 94.8 kg (209 lb). His temperature is 98.7 °F (37.1 °C). His blood pressure is 131/81 and his pulse is 85. His respiration is 18 and oxygen saturation is 98%.     Chief Complaint: Back Pain (Entered by patient)    Patient states he has had some left mid back spasms  for the past 36hs.  He states to have gone to the chiropractor yesterday but continues to have discomfort.       Back Pain  This is a new problem. The current episode started yesterday. The problem has been gradually worsening since onset. The pain is at a severity of 6/10. The symptoms are aggravated by bending and standing.     Musculoskeletal:  Positive for back pain.     Objective:      Physical Exam   Constitutional: He is oriented to person, place, and time. He appears well-developed. He is cooperative. No distress.   HENT:   Head: Normocephalic and atraumatic.   Nose: Nose normal.   Mouth/Throat: Oropharynx is clear and moist and mucous membranes are normal.   Eyes: Conjunctivae and lids are normal.   Neck: Trachea normal and phonation normal. Neck supple.   Cardiovascular: Normal rate, regular rhythm, normal heart sounds and normal pulses.   Pulmonary/Chest: Effort normal and breath sounds normal. No accessory muscle usage. No respiratory distress. He has no wheezes. He has no rhonchi.           Abdominal: Normal appearance and bowel sounds are normal. He exhibits no mass. Soft.   Musculoskeletal:         General: No deformity.      Lumbar back: He exhibits tenderness. He exhibits normal range of motion, no bony tenderness and no spasm.   Neurological: He is alert and oriented to person, place, and time. He has normal strength and normal reflexes. No sensory deficit.   Skin: Skin is warm, dry, intact and not diaphoretic.   Psychiatric: His speech is normal and behavior is normal. Judgment and thought content normal.   Nursing " note and vitals reviewed.      Assessment:       1. Back pain, unspecified back location, unspecified back pain laterality, unspecified chronicity          Plan:         Back pain, unspecified back location, unspecified back pain laterality, unspecified chronicity  -     POCT Urinalysis, Dipstick, Automated, W/O Scope  -     XR CHEST PA AND LATERAL; Future; Expected date: 09/21/2022  Results for orders placed or performed in visit on 09/21/22   POCT Urinalysis, Dipstick, Automated, W/O Scope   Result Value Ref Range    POC Blood, Urine Negative Negative    POC Bilirubin, Urine Negative Negative    POC Urobilinogen, Urine Normal 0.3 - 2.2    POC Ketones, Urine Negative Negative    POC Protein, Urine Negative Negative    POC Nitrates, Urine Negative Negative    POC Glucose, Urine Negative Negative    pH, UA 8.0 5 - 8    POC Specific Gravity, Urine 1.010 1.003 - 1.029    POC Leukocytes, Urine Negative Negative          Type of Interpretation: ED Physician (Independently Interpreted).  Radiology Procedure Done: AP & Lat CXR.  Interpretation: No infiltrate, effusion, or pneumothorax      Recommend rest, ice, NSAID's, continue with chiropractor therapy. Precautions given

## 2023-01-23 ENCOUNTER — OFFICE VISIT (OUTPATIENT)
Dept: DERMATOLOGY | Facility: CLINIC | Age: 43
End: 2023-01-23
Payer: COMMERCIAL

## 2023-01-23 DIAGNOSIS — L84 CORN: Primary | ICD-10-CM

## 2023-01-23 DIAGNOSIS — T14.8XXA FOREIGN BODY IN SKIN: ICD-10-CM

## 2023-01-23 PROCEDURE — 99999 PR PBB SHADOW E&M-EST. PATIENT-LVL II: CPT | Mod: PBBFAC,,, | Performed by: STUDENT IN AN ORGANIZED HEALTH CARE EDUCATION/TRAINING PROGRAM

## 2023-01-23 PROCEDURE — 1159F MED LIST DOCD IN RCRD: CPT | Mod: CPTII,S$GLB,, | Performed by: STUDENT IN AN ORGANIZED HEALTH CARE EDUCATION/TRAINING PROGRAM

## 2023-01-23 PROCEDURE — 99203 PR OFFICE/OUTPT VISIT, NEW, LEVL III, 30-44 MIN: ICD-10-PCS | Mod: S$GLB,,, | Performed by: STUDENT IN AN ORGANIZED HEALTH CARE EDUCATION/TRAINING PROGRAM

## 2023-01-23 PROCEDURE — 1160F PR REVIEW ALL MEDS BY PRESCRIBER/CLIN PHARMACIST DOCUMENTED: ICD-10-PCS | Mod: CPTII,S$GLB,, | Performed by: STUDENT IN AN ORGANIZED HEALTH CARE EDUCATION/TRAINING PROGRAM

## 2023-01-23 PROCEDURE — 1159F PR MEDICATION LIST DOCUMENTED IN MEDICAL RECORD: ICD-10-PCS | Mod: CPTII,S$GLB,, | Performed by: STUDENT IN AN ORGANIZED HEALTH CARE EDUCATION/TRAINING PROGRAM

## 2023-01-23 PROCEDURE — 99203 OFFICE O/P NEW LOW 30 MIN: CPT | Mod: S$GLB,,, | Performed by: STUDENT IN AN ORGANIZED HEALTH CARE EDUCATION/TRAINING PROGRAM

## 2023-01-23 PROCEDURE — 1160F RVW MEDS BY RX/DR IN RCRD: CPT | Mod: CPTII,S$GLB,, | Performed by: STUDENT IN AN ORGANIZED HEALTH CARE EDUCATION/TRAINING PROGRAM

## 2023-01-23 PROCEDURE — 99999 PR PBB SHADOW E&M-EST. PATIENT-LVL II: ICD-10-PCS | Mod: PBBFAC,,, | Performed by: STUDENT IN AN ORGANIZED HEALTH CARE EDUCATION/TRAINING PROGRAM

## 2023-01-23 NOTE — PROGRESS NOTES
Subjective:       Patient ID:  Tre Sommer Sr. is a 42 y.o. male who presents for No chief complaint on file.    HPI  Right index finger lesion. > 1 month. Does crossfit. Painful when he  with the right hand    Review of Systems     Objective:    Physical Exam   Constitutional: He appears well-developed and well-nourished. No distress.   Neurological: He is alert and oriented to person, place, and time. He is not disoriented.   Psychiatric: He has a normal mood and affect.   Skin:   Areas Examined (abnormalities noted in diagram):   RUE Inspected           Diagram Legend     Erythematous scaling macule/papule c/w actinic keratosis       Vascular papule c/w angioma      Pigmented verrucoid papule/plaque c/w seborrheic keratosis      Yellow umbilicated papule c/w sebaceous hyperplasia      Irregularly shaped tan macule c/w lentigo     1-2 mm smooth white papules consistent with Milia      Movable subcutaneous cyst with punctum c/w epidermal inclusion cyst      Subcutaneous movable cyst c/w pilar cyst      Firm pink to brown papule c/w dermatofibroma      Pedunculated fleshy papule(s) c/w skin tag(s)      Evenly pigmented macule c/w junctional nevus     Mildly variegated pigmented, slightly irregular-bordered macule c/w mildly atypical nevus      Flesh colored to evenly pigmented papule c/w intradermal nevus       Pink pearly papule/plaque c/w basal cell carcinoma      Erythematous hyperkeratotic cursted plaque c/w SCC      Surgical scar with no sign of skin cancer recurrence      Open and closed comedones      Inflammatory papules and pustules      Verrucoid papule consistent consistent with wart     Erythematous eczematous patches and plaques     Dystrophic onycholytic nail with subungual debris c/w onychomycosis     Umbilicated papule    Erythematous-base heme-crusted tan verrucoid plaque consistent with inflamed seborrheic keratosis     Erythematous Silvery Scaling Plaque c/w Psoriasis     See  annotation      Assessment / Plan:        Corn vs Foreign body in skin--right index palmar finger  - patient does cross fit and it is in the crease on his index finger, which is a location prone to getting a corn. Foreign body is also possible give the pain and central keratotic punctum / core  - lesion was numbed with 1% lido. Verbal consent obtained. Lesion was curetted to just beneath the stratum corneum. A small core was removed. Bandage applied  - cover with pad or bandage when working out  - mediplast 40% sal acid pads overnight to keep it thin          Follow up if symptoms worsen or fail to improve.

## 2023-01-23 NOTE — PATIENT INSTRUCTIONS
[facility] Mediplast [place], Callus, & Wart Remover, 40% Salicylic Acid Pads for topical removal of corns, callus, or plantar warts (25 Pads)

## 2023-03-27 ENCOUNTER — HOSPITAL ENCOUNTER (OUTPATIENT)
Dept: RADIOLOGY | Facility: HOSPITAL | Age: 43
Discharge: HOME | End: 2023-03-27
Attending: NURSE PRACTITIONER
Payer: COMMERCIAL

## 2023-03-27 ENCOUNTER — OFFICE VISIT (OUTPATIENT)
Dept: PRIMARY CARE CLINIC | Facility: CLINIC | Age: 43
End: 2023-03-27
Payer: COMMERCIAL

## 2023-03-27 VITALS
BODY MASS INDEX: 27.5 KG/M2 | OXYGEN SATURATION: 98 % | SYSTOLIC BLOOD PRESSURE: 120 MMHG | HEART RATE: 82 BPM | WEIGHT: 220 LBS | DIASTOLIC BLOOD PRESSURE: 78 MMHG

## 2023-03-27 DIAGNOSIS — R22.2 MASS OF SKIN OF BACK: ICD-10-CM

## 2023-03-27 DIAGNOSIS — R22.2 MASS OF SKIN OF BACK: Primary | ICD-10-CM

## 2023-03-27 PROCEDURE — 3074F SYST BP LT 130 MM HG: CPT | Mod: CPTII,S$GLB,, | Performed by: NURSE PRACTITIONER

## 2023-03-27 PROCEDURE — 3078F PR MOST RECENT DIASTOLIC BLOOD PRESSURE < 80 MM HG: ICD-10-PCS | Mod: CPTII,S$GLB,, | Performed by: NURSE PRACTITIONER

## 2023-03-27 PROCEDURE — 99214 PR OFFICE/OUTPT VISIT, EST, LEVL IV, 30-39 MIN: ICD-10-PCS | Mod: S$GLB,,, | Performed by: NURSE PRACTITIONER

## 2023-03-27 PROCEDURE — 76604 US EXAM CHEST: CPT | Mod: TC

## 2023-03-27 PROCEDURE — 76604 US EXAM CHEST: CPT | Mod: 26,,, | Performed by: RADIOLOGY

## 2023-03-27 PROCEDURE — 1160F RVW MEDS BY RX/DR IN RCRD: CPT | Mod: CPTII,S$GLB,, | Performed by: NURSE PRACTITIONER

## 2023-03-27 PROCEDURE — 76604 US SOFT TISSUE CHEST_UPPER BACK: ICD-10-PCS | Mod: 26,,, | Performed by: RADIOLOGY

## 2023-03-27 PROCEDURE — 99214 OFFICE O/P EST MOD 30 MIN: CPT | Mod: S$GLB,,, | Performed by: NURSE PRACTITIONER

## 2023-03-27 PROCEDURE — 1159F PR MEDICATION LIST DOCUMENTED IN MEDICAL RECORD: ICD-10-PCS | Mod: CPTII,S$GLB,, | Performed by: NURSE PRACTITIONER

## 2023-03-27 PROCEDURE — 3008F BODY MASS INDEX DOCD: CPT | Mod: CPTII,S$GLB,, | Performed by: NURSE PRACTITIONER

## 2023-03-27 PROCEDURE — 3008F PR BODY MASS INDEX (BMI) DOCUMENTED: ICD-10-PCS | Mod: CPTII,S$GLB,, | Performed by: NURSE PRACTITIONER

## 2023-03-27 PROCEDURE — 1159F MED LIST DOCD IN RCRD: CPT | Mod: CPTII,S$GLB,, | Performed by: NURSE PRACTITIONER

## 2023-03-27 PROCEDURE — 3078F DIAST BP <80 MM HG: CPT | Mod: CPTII,S$GLB,, | Performed by: NURSE PRACTITIONER

## 2023-03-27 PROCEDURE — 99999 PR PBB SHADOW E&M-EST. PATIENT-LVL III: ICD-10-PCS | Mod: PBBFAC,,, | Performed by: NURSE PRACTITIONER

## 2023-03-27 PROCEDURE — 3074F PR MOST RECENT SYSTOLIC BLOOD PRESSURE < 130 MM HG: ICD-10-PCS | Mod: CPTII,S$GLB,, | Performed by: NURSE PRACTITIONER

## 2023-03-27 PROCEDURE — 99999 PR PBB SHADOW E&M-EST. PATIENT-LVL III: CPT | Mod: PBBFAC,,, | Performed by: NURSE PRACTITIONER

## 2023-03-27 PROCEDURE — 1160F PR REVIEW ALL MEDS BY PRESCRIBER/CLIN PHARMACIST DOCUMENTED: ICD-10-PCS | Mod: CPTII,S$GLB,, | Performed by: NURSE PRACTITIONER

## 2023-03-27 NOTE — PROGRESS NOTES
[facility]    Chief Complaint      Chief Complaint   Patient presents with    Cyst     On back x 2 weeks      History of Present Illness      Tre Sommer Sr. is a 42 y.o. male patient of Dr. Ibrahim with chronic conditions of history of sebaceous cyst who presents today for concerns of a lump on his back that his wife noticed about 3 weeks ago. Pt reports no pain, wife thinks it gets larger than smaller. No c/o sob, back pain      Health Maintenance   Topic Date Due    TETANUS VACCINE  07/03/2027    Lipid Panel  08/01/2027    Hepatitis C Screening  Completed       Past Medical History:   Diagnosis Date    Syncope and collapse        Past Surgical History:   Procedure Laterality Date    ADENOIDECTOMY      CIRCUMCISION, PRIMARY      SOFT TISSUE CYST EXCISION      Epidermal Inclusion Cyst    Tonsilectomy      Age 5 or 6    TONSILLECTOMY      VASECTOMY         family history includes Cancer in his maternal grandmother; Diabetes in his father.     Social History     Tobacco Use    Smoking status: Never    Smokeless tobacco: Never   Substance Use Topics    Alcohol use: Yes     Comment: occasional    Drug use: No       Review of Systems   Constitutional:  Negative for chills, fever and malaise/fatigue.   Respiratory:  Negative for shortness of breath.    Cardiovascular:  Negative for chest pain and palpitations.   Gastrointestinal:  Negative for nausea and vomiting.   Genitourinary:  Negative for dysuria.   Musculoskeletal:  Negative for back pain, joint pain, myalgias and neck pain.   Skin:         Back mass   Neurological:  Negative for dizziness and headaches.      Outpatient Encounter Medications as of 3/27/2023   Medication Sig Note Dispense Refill    multivitamin with minerals tablet Take 1 tablet by mouth once daily. 10/7/2015: Hold am of surgery      [DISCONTINUED] tadalafiL (CIALIS) 20 MG Tab Take 1 tablet (20 mg total) by mouth once daily.  30 tablet 1     No facility-administered  encounter medications on file as of 3/27/2023.       Review of patient's allergies indicates:  No Known Allergies    Physical Exam      Vital Signs  Pulse: 82  SpO2: 98 %  BP: 120/78  Height and Weight  Weight: 99.8 kg (220 lb 0.3 oz)    Physical Exam  Vitals and nursing note reviewed.   Constitutional:       General: He is not in acute distress.     Appearance: Normal appearance. He is not ill-appearing.   HENT:      Head: Normocephalic and atraumatic.   Cardiovascular:      Rate and Rhythm: Normal rate and regular rhythm.      Heart sounds: Normal heart sounds.   Pulmonary:      Effort: Pulmonary effort is normal. No respiratory distress.      Breath sounds: Normal breath sounds.   Musculoskeletal:         General: No swelling, tenderness or signs of injury. Normal range of motion.        Arms:    Skin:     General: Skin is warm and dry.      Findings: No bruising, erythema or rash.   Neurological:      General: No focal deficit present.      Mental Status: He is alert and oriented to person, place, and time.   Psychiatric:         Mood and Affect: Mood normal.         Behavior: Behavior normal.         Thought Content: Thought content normal.         Judgment: Judgment normal.        Laboratory:  CBC:  Lab Results   Component Value Date    WBC 3.45 (L) 08/01/2022    RBC 5.64 08/01/2022    HGB 13.4 (L) 08/01/2022    HCT 42.5 08/01/2022     08/01/2022    MCV 75 (L) 08/01/2022    MCH 23.8 (L) 08/01/2022    MCHC 31.5 (L) 08/01/2022    MCHC 31.0 (L) 01/18/2022    MCHC 30.8 (L) 10/16/2020     CMP:  Lab Results   Component Value Date    GLU 88 08/01/2022    CALCIUM 9.4 08/01/2022    ALBUMIN 4.3 08/01/2022    PROT 7.2 08/01/2022     08/01/2022    K 4.3 08/01/2022    CO2 28 08/01/2022     08/01/2022    BUN 12 08/01/2022    ALKPHOS 45 (L) 08/01/2022    ALT 19 08/01/2022    AST 19 08/01/2022    BILITOT 0.6 08/01/2022    BILITOT 0.9 01/18/2022    BILITOT 0.5 10/16/2020     URINALYSIS:  Lab Results    Component Value Date    COLORU Yellow 01/18/2022    SPECGRAV 1.015 01/18/2022    PHUR 8.0 09/21/2022    PROTEINUA Negative 01/18/2022    NITRITE Negative 01/18/2022    LEUKOCYTESUR Negative 01/18/2022    UROBILINOGEN Negative 08/13/2016      LIPIDS:  Lab Results   Component Value Date    TSH 1.953 08/01/2022    TSH 1.540 01/18/2022    TSH 1.220 10/16/2020    HDL 77 (H) 08/01/2022    HDL 83 (H) 01/18/2022    HDL 89 (H) 10/16/2020    CHOL 197 08/01/2022    CHOL 194 01/18/2022    CHOL 208 (H) 10/16/2020    TRIG 70 08/01/2022    TRIG 53 01/18/2022    TRIG 72 10/16/2020    LDLCALC 106.0 08/01/2022    LDLCALC 100.4 01/18/2022    LDLCALC 104.6 10/16/2020    CHOLHDL 39.1 08/01/2022    CHOLHDL 42.8 01/18/2022    CHOLHDL 42.8 10/16/2020    NONHDLCHOL 120 08/01/2022    NONHDLCHOL 111 01/18/2022    NONHDLCHOL 119 10/16/2020    TOTALCHOLEST 2.6 08/01/2022    TOTALCHOLEST 2.3 01/18/2022    TOTALCHOLEST 2.3 10/16/2020     TSH:  Lab Results   Component Value Date    TSH 1.953 08/01/2022    TSH 1.540 01/18/2022    TSH 1.220 10/16/2020     A1C:  Lab Results   Component Value Date    HGBA1C 5.6 08/01/2022    HGBA1C 5.4 01/18/2022    HGBA1C 5.4 10/16/2020    HGBA1C 5.4 11/11/2019    HGBA1C 5.6 07/03/2017    HGBA1C 5.5 04/01/2015    HGBA1C 5.6 07/01/2013         Assessment/Plan     Chuysalvador GOVEA Ros Mendoza is a 42 y.o.male with:    Mass of skin of back  -     US Soft Tissue Head Neck Thyroid; Future; Expected date: 03/27/2023         Health Maintenance Due   Topic Date Due    COVID-19 Vaccine (1) Never done    Influenza Vaccine (1) 09/01/2022      Plan to confirm and refer to gen surg    I spent 35 minutes on the day of this encounter for preparing for, evaluating, treating, and managing this patient.        -Continue current medications and maintain follow up with specialists.  Return to clinic as needed for any concerns  No follow-ups on file.      JESU SMANUEL LangfordC  [facility]

## 2023-03-29 ENCOUNTER — PATIENT MESSAGE (OUTPATIENT)
Dept: PRIMARY CARE CLINIC | Facility: CLINIC | Age: 43
End: 2023-03-29
Payer: COMMERCIAL

## 2023-03-29 DIAGNOSIS — D17.9 LIPOMA, UNSPECIFIED SITE: Primary | ICD-10-CM

## 2023-04-05 ENCOUNTER — OFFICE VISIT (OUTPATIENT)
Dept: SURGERY | Facility: CLINIC | Age: 43
End: 2023-04-05
Payer: COMMERCIAL

## 2023-04-05 VITALS
OXYGEN SATURATION: 98 % | WEIGHT: 214.94 LBS | BODY MASS INDEX: 26.73 KG/M2 | HEART RATE: 56 BPM | HEIGHT: 75 IN | SYSTOLIC BLOOD PRESSURE: 122 MMHG | DIASTOLIC BLOOD PRESSURE: 69 MMHG

## 2023-04-05 DIAGNOSIS — D17.1 LIPOMA OF BACK: Primary | ICD-10-CM

## 2023-04-05 PROCEDURE — 1160F RVW MEDS BY RX/DR IN RCRD: CPT | Mod: CPTII,S$GLB,, | Performed by: STUDENT IN AN ORGANIZED HEALTH CARE EDUCATION/TRAINING PROGRAM

## 2023-04-05 PROCEDURE — 3074F SYST BP LT 130 MM HG: CPT | Mod: CPTII,S$GLB,, | Performed by: STUDENT IN AN ORGANIZED HEALTH CARE EDUCATION/TRAINING PROGRAM

## 2023-04-05 PROCEDURE — 1159F MED LIST DOCD IN RCRD: CPT | Mod: CPTII,S$GLB,, | Performed by: STUDENT IN AN ORGANIZED HEALTH CARE EDUCATION/TRAINING PROGRAM

## 2023-04-05 PROCEDURE — 3008F BODY MASS INDEX DOCD: CPT | Mod: CPTII,S$GLB,, | Performed by: STUDENT IN AN ORGANIZED HEALTH CARE EDUCATION/TRAINING PROGRAM

## 2023-04-05 PROCEDURE — 3078F PR MOST RECENT DIASTOLIC BLOOD PRESSURE < 80 MM HG: ICD-10-PCS | Mod: CPTII,S$GLB,, | Performed by: STUDENT IN AN ORGANIZED HEALTH CARE EDUCATION/TRAINING PROGRAM

## 2023-04-05 PROCEDURE — 99203 OFFICE O/P NEW LOW 30 MIN: CPT | Mod: S$GLB,,, | Performed by: STUDENT IN AN ORGANIZED HEALTH CARE EDUCATION/TRAINING PROGRAM

## 2023-04-05 PROCEDURE — 99203 PR OFFICE/OUTPT VISIT, NEW, LEVL III, 30-44 MIN: ICD-10-PCS | Mod: S$GLB,,, | Performed by: STUDENT IN AN ORGANIZED HEALTH CARE EDUCATION/TRAINING PROGRAM

## 2023-04-05 PROCEDURE — 3074F PR MOST RECENT SYSTOLIC BLOOD PRESSURE < 130 MM HG: ICD-10-PCS | Mod: CPTII,S$GLB,, | Performed by: STUDENT IN AN ORGANIZED HEALTH CARE EDUCATION/TRAINING PROGRAM

## 2023-04-05 PROCEDURE — 1160F PR REVIEW ALL MEDS BY PRESCRIBER/CLIN PHARMACIST DOCUMENTED: ICD-10-PCS | Mod: CPTII,S$GLB,, | Performed by: STUDENT IN AN ORGANIZED HEALTH CARE EDUCATION/TRAINING PROGRAM

## 2023-04-05 PROCEDURE — 3008F PR BODY MASS INDEX (BMI) DOCUMENTED: ICD-10-PCS | Mod: CPTII,S$GLB,, | Performed by: STUDENT IN AN ORGANIZED HEALTH CARE EDUCATION/TRAINING PROGRAM

## 2023-04-05 PROCEDURE — 99999 PR PBB SHADOW E&M-EST. PATIENT-LVL III: ICD-10-PCS | Mod: PBBFAC,,, | Performed by: STUDENT IN AN ORGANIZED HEALTH CARE EDUCATION/TRAINING PROGRAM

## 2023-04-05 PROCEDURE — 1159F PR MEDICATION LIST DOCUMENTED IN MEDICAL RECORD: ICD-10-PCS | Mod: CPTII,S$GLB,, | Performed by: STUDENT IN AN ORGANIZED HEALTH CARE EDUCATION/TRAINING PROGRAM

## 2023-04-05 PROCEDURE — 3078F DIAST BP <80 MM HG: CPT | Mod: CPTII,S$GLB,, | Performed by: STUDENT IN AN ORGANIZED HEALTH CARE EDUCATION/TRAINING PROGRAM

## 2023-04-05 PROCEDURE — 99999 PR PBB SHADOW E&M-EST. PATIENT-LVL III: CPT | Mod: PBBFAC,,, | Performed by: STUDENT IN AN ORGANIZED HEALTH CARE EDUCATION/TRAINING PROGRAM

## 2023-04-05 NOTE — PROGRESS NOTES
[facility] 2nd Fl  General Surgery  History and Physical      Subjective:     CC: Right upper back mass    HPI:  Tre Sommer Sr. is a 42 y.o. male otherwise healthy who presents with a right upper back mass. His wife noticed it a couple of weeks ago and feels it may be getting bigger. He went to his PCP who ordered an US of the mass. This showed a 4 x 0.7 x 3.5 cm hypoechoic circumscribed area possibly a lipoma. He was then referred here. In my discussion with the patient, his mother is currently undergoing cancer treatment and she he is trying to take care of her. He has some interest in removal, but does not want something that would be invasive or require a lot of down time. The mass is asymptomatic.         ROS:  Gen: no fevers, no chills, no recent weight loss  CV: no palpitations, no peripheral edema  Respit: no cough, no SOB  Abd: no abd pain, no nausea, no vomiting  Skin: no rash, no wound  MSK: no back pain, no arthralgias, no myalgias, +back mass  Endo: no excessive hunger or thirst  Heme: no lymphadenopathy  Neuro: no headache, no dizziness  Psych: no depression, no anxiety    Medical History  Past Medical History:   Diagnosis Date    Syncope and collapse      Past Surgical History:   Procedure Laterality Date    ADENOIDECTOMY      CIRCUMCISION, PRIMARY      SOFT TISSUE CYST EXCISION      Epidermal Inclusion Cyst    Tonsilectomy      Age 5 or 6    TONSILLECTOMY      VASECTOMY       Review of patient's allergies indicates:  No Known Allergies    Current Outpatient Medications:     multivitamin with minerals tablet, Take 1 tablet by mouth once daily., Disp: , Rfl:     Family History       Problem Relation (Age of Onset)    Cancer Maternal Grandmother    Diabetes Father          Tobacco Use    Smoking status: Never    Smokeless tobacco: Never   Substance and Sexual Activity    Alcohol use: Yes     Comment: occasional    Drug use: No    Sexual activity: Yes     Partners: Female     Birth  control/protection: None       Objective:     Vitals:   Vitals:    04/05/23 0858   BP: 122/69   Pulse: (!) 56       Physical Exam:  Gen: well appearing, no acute distress  HEENT: normocephalic, EOMI  Neck: no tracheal deviation, no cervical LAD  CV: RRR, extremities are warm and well perfused  Respit: breathing non-labored  Abd: soft, non-tender, non-distended  MSK: moves all extremities appropriately. Large wing tattoos on back with a 3 x 4 cm lipoma in the right upper back. This lies below the scapula along one of the feather edges of the wing  Skin: No rash or wounds  Neuro: alert, CN II - XII grossly intact  Psych: normal mood and affect    Significant Diagnostics:  Reviewed    US Back 3/27/23  FINDINGS:  Corresponding to the area of reported palpable lump is 4 x 0.7 by 3.5 cm slightly hypoechoic circumscribed area nonspecific, possibly lipoma.  If indicated clinically CT could be obtained for further evaluation.  No drainable fluid collection is seen.    Assessment/Plan:     Tre Sommer  is a 42 y.o. male with a right upper back lipoma      - Discussed removal with the patient and anticipated recovery. This could be done in the minors room and with consideration for cosmesis given the location with his tattoo.  - He will think about removal and will call back if/when he is ready to get it out    Brittney Guillen MD  General Surgery, PGY-4  [phone]

## 2023-05-10 ENCOUNTER — PROCEDURE VISIT (OUTPATIENT)
Dept: SURGERY | Facility: CLINIC | Age: 43
End: 2023-05-10
Payer: COMMERCIAL

## 2023-05-10 VITALS
BODY MASS INDEX: 27.49 KG/M2 | DIASTOLIC BLOOD PRESSURE: 81 MMHG | HEART RATE: 50 BPM | SYSTOLIC BLOOD PRESSURE: 137 MMHG | HEIGHT: 75 IN | WEIGHT: 221.13 LBS | OXYGEN SATURATION: 100 %

## 2023-05-10 DIAGNOSIS — D17.9 LIPOMA, UNSPECIFIED SITE: Primary | ICD-10-CM

## 2023-05-10 DIAGNOSIS — R22.2 SUBCUTANEOUS MASS OF BACK: ICD-10-CM

## 2023-05-10 PROCEDURE — 88305 TISSUE EXAM BY PATHOLOGIST: ICD-10-PCS | Mod: 26,,, | Performed by: PATHOLOGY

## 2023-05-10 PROCEDURE — 88305 TISSUE EXAM BY PATHOLOGIST: CPT | Performed by: PATHOLOGY

## 2023-05-10 PROCEDURE — 12042 INTMD RPR N-HF/GENIT2.6-7.5: CPT | Mod: 59,S$GLB,, | Performed by: STUDENT IN AN ORGANIZED HEALTH CARE EDUCATION/TRAINING PROGRAM

## 2023-05-10 PROCEDURE — 21932 EXC BACK TUM DEEP < 5 CM: CPT | Mod: S$GLB,,, | Performed by: STUDENT IN AN ORGANIZED HEALTH CARE EDUCATION/TRAINING PROGRAM

## 2023-05-10 PROCEDURE — 12042 PR LAYR CLOS WND REST BODY 2.6-7.5 CM: ICD-10-PCS | Mod: 59,S$GLB,, | Performed by: STUDENT IN AN ORGANIZED HEALTH CARE EDUCATION/TRAINING PROGRAM

## 2023-05-10 PROCEDURE — 88305 TISSUE EXAM BY PATHOLOGIST: CPT | Mod: 26,,, | Performed by: PATHOLOGY

## 2023-05-10 PROCEDURE — 21932 PR EXC TUMOR SOFT TISS BACK/FLANK SUBFASCIAL <5CM: ICD-10-PCS | Mod: S$GLB,,, | Performed by: STUDENT IN AN ORGANIZED HEALTH CARE EDUCATION/TRAINING PROGRAM

## 2023-05-10 NOTE — PROCEDURES
Subcutaneous Mass Removal Procedure Note    Pre-operative Diagnosis: Right upper back subcutaneous mass    Post-operative Diagnosis: Same    Indications:  42 y.o. male otherwise healthy who presents with a right upper back mass. This measured 4 x 0.7 x 3.5 cm  by ultrasound and on exam was consistent with a lipoma. He started having discomfort and after discussion with the patient, elected for removal.     Anesthesia: Lidocaine 1% with epinephrine    Procedure Details     Patient informed of the risks (including bleeding and infection) and benefits of the procedure and Written informed consent obtained.    The patient was prepped with chlorhexidine and then draped in the sterile fashion. Local anesthetic was injected and then a curvilinear incision was made along the edge of his tattoo. Sharp dissection was carried down through the subcutaneous tissue until we identified a smooth fatty mass consistent with a lipoma. There was an intramuscular component although most of it was just along the fascia. Once removed it was passed off the field for pathological review. The wound was irrigated. The skin was closed in layers with 3-0 Vicryl deep dermal sutures followed by a 4-0 Monocryl suture in a running subcuticular fashion. Demabond was then applied. The patient tolerated the procedure well and emerged from anesthesia without issue. All sponge and needle counts were correct at the end of the case x2. Dr. Andrew was present and scrubbed for all portions of the procedure.      Specimens:   1. Right Upper Back Mass    Brittney Guillen MD  General Surgery, PGY-4  [phone]

## 2023-05-17 LAB
FINAL PATHOLOGIC DIAGNOSIS: NORMAL
Lab: NORMAL

## 2023-09-27 ENCOUNTER — PATIENT MESSAGE (OUTPATIENT)
Dept: PRIMARY CARE CLINIC | Facility: CLINIC | Age: 43
End: 2023-09-27
Payer: COMMERCIAL

## 2023-09-27 DIAGNOSIS — N52.9 ERECTILE DYSFUNCTION, UNSPECIFIED ERECTILE DYSFUNCTION TYPE: Primary | ICD-10-CM

## 2023-09-29 RX ORDER — TADALAFIL 20 MG/1
20 TABLET ORAL DAILY
Qty: 30 TABLET | Refills: 0 | Status: SHIPPED | OUTPATIENT
Start: 2023-09-29 | End: 2023-10-09 | Stop reason: SDUPTHER

## 2023-10-09 ENCOUNTER — CLINICAL SUPPORT (OUTPATIENT)
Dept: INTERNAL MEDICINE | Facility: CLINIC | Age: 43
End: 2023-10-09
Payer: COMMERCIAL

## 2023-10-09 ENCOUNTER — OFFICE VISIT (OUTPATIENT)
Dept: UROLOGY | Facility: CLINIC | Age: 43
End: 2023-10-09
Payer: COMMERCIAL

## 2023-10-09 VITALS
BODY MASS INDEX: 27.49 KG/M2 | HEIGHT: 75 IN | SYSTOLIC BLOOD PRESSURE: 134 MMHG | HEART RATE: 62 BPM | DIASTOLIC BLOOD PRESSURE: 78 MMHG | WEIGHT: 221.13 LBS

## 2023-10-09 DIAGNOSIS — Z23 FLU VACCINE NEED: Primary | ICD-10-CM

## 2023-10-09 DIAGNOSIS — N52.9 ERECTILE DYSFUNCTION, UNSPECIFIED ERECTILE DYSFUNCTION TYPE: Primary | ICD-10-CM

## 2023-10-09 DIAGNOSIS — Z12.5 PROSTATE CANCER SCREENING: ICD-10-CM

## 2023-10-09 PROCEDURE — 99999 PR PBB SHADOW E&M-EST. PATIENT-LVL I: ICD-10-PCS | Mod: PBBFAC,,,

## 2023-10-09 PROCEDURE — 1159F MED LIST DOCD IN RCRD: CPT | Mod: CPTII,S$GLB,, | Performed by: STUDENT IN AN ORGANIZED HEALTH CARE EDUCATION/TRAINING PROGRAM

## 2023-10-09 PROCEDURE — 90686 FLU VACCINE (QUAD) GREATER THAN OR EQUAL TO 3YO PRESERVATIVE FREE IM: ICD-10-PCS | Mod: S$GLB,,, | Performed by: FAMILY MEDICINE

## 2023-10-09 PROCEDURE — 1159F PR MEDICATION LIST DOCUMENTED IN MEDICAL RECORD: ICD-10-PCS | Mod: CPTII,S$GLB,, | Performed by: STUDENT IN AN ORGANIZED HEALTH CARE EDUCATION/TRAINING PROGRAM

## 2023-10-09 PROCEDURE — 3075F PR MOST RECENT SYSTOLIC BLOOD PRESS GE 130-139MM HG: ICD-10-PCS | Mod: CPTII,S$GLB,, | Performed by: STUDENT IN AN ORGANIZED HEALTH CARE EDUCATION/TRAINING PROGRAM

## 2023-10-09 PROCEDURE — 3075F SYST BP GE 130 - 139MM HG: CPT | Mod: CPTII,S$GLB,, | Performed by: STUDENT IN AN ORGANIZED HEALTH CARE EDUCATION/TRAINING PROGRAM

## 2023-10-09 PROCEDURE — 99999 PR PBB SHADOW E&M-EST. PATIENT-LVL III: ICD-10-PCS | Mod: PBBFAC,,, | Performed by: STUDENT IN AN ORGANIZED HEALTH CARE EDUCATION/TRAINING PROGRAM

## 2023-10-09 PROCEDURE — 3078F PR MOST RECENT DIASTOLIC BLOOD PRESSURE < 80 MM HG: ICD-10-PCS | Mod: CPTII,S$GLB,, | Performed by: STUDENT IN AN ORGANIZED HEALTH CARE EDUCATION/TRAINING PROGRAM

## 2023-10-09 PROCEDURE — 3078F DIAST BP <80 MM HG: CPT | Mod: CPTII,S$GLB,, | Performed by: STUDENT IN AN ORGANIZED HEALTH CARE EDUCATION/TRAINING PROGRAM

## 2023-10-09 PROCEDURE — 90471 IMMUNIZATION ADMIN: CPT | Mod: S$GLB,,, | Performed by: FAMILY MEDICINE

## 2023-10-09 PROCEDURE — 99214 OFFICE O/P EST MOD 30 MIN: CPT | Mod: S$GLB,,, | Performed by: STUDENT IN AN ORGANIZED HEALTH CARE EDUCATION/TRAINING PROGRAM

## 2023-10-09 PROCEDURE — 99214 PR OFFICE/OUTPT VISIT, EST, LEVL IV, 30-39 MIN: ICD-10-PCS | Mod: S$GLB,,, | Performed by: STUDENT IN AN ORGANIZED HEALTH CARE EDUCATION/TRAINING PROGRAM

## 2023-10-09 PROCEDURE — 90686 IIV4 VACC NO PRSV 0.5 ML IM: CPT | Mod: S$GLB,,, | Performed by: FAMILY MEDICINE

## 2023-10-09 PROCEDURE — 3008F PR BODY MASS INDEX (BMI) DOCUMENTED: ICD-10-PCS | Mod: CPTII,S$GLB,, | Performed by: STUDENT IN AN ORGANIZED HEALTH CARE EDUCATION/TRAINING PROGRAM

## 2023-10-09 PROCEDURE — 99999 PR PBB SHADOW E&M-EST. PATIENT-LVL III: CPT | Mod: PBBFAC,,, | Performed by: STUDENT IN AN ORGANIZED HEALTH CARE EDUCATION/TRAINING PROGRAM

## 2023-10-09 PROCEDURE — 3008F BODY MASS INDEX DOCD: CPT | Mod: CPTII,S$GLB,, | Performed by: STUDENT IN AN ORGANIZED HEALTH CARE EDUCATION/TRAINING PROGRAM

## 2023-10-09 PROCEDURE — 90471 FLU VACCINE (QUAD) GREATER THAN OR EQUAL TO 3YO PRESERVATIVE FREE IM: ICD-10-PCS | Mod: S$GLB,,, | Performed by: FAMILY MEDICINE

## 2023-10-09 PROCEDURE — 99999 PR PBB SHADOW E&M-EST. PATIENT-LVL I: CPT | Mod: PBBFAC,,,

## 2023-10-09 RX ORDER — TADALAFIL 20 MG/1
20 TABLET ORAL DAILY
Qty: 30 TABLET | Refills: 5 | Status: SHIPPED | OUTPATIENT
Start: 2023-10-09 | End: 2024-10-08

## 2023-10-09 NOTE — PROGRESS NOTES
[facility]    PCP: Dana Gomez NP    Chief Complaint: ED    SUBJECTIVE:       History of Present Illness:  Tre Sommer Sr. is a 43 y.o. male who presents to clinic for ED. He is Established  to our clinic.     Here for check up.   No family hx of prostate cancer.   No voiding issues. No hematuria.     Cialis is working for his ED. Sometimes takes 10 mg or 20 mg.     2/15/21  Noticed decrease in libido and erectile issues for the last month. Potential trigger - wife was on heavy period and attempted intercourse which freaked him out a little bit.   Has natural am erections. Notices that he is able to attain an erection however looses erections during intercourse. Last few weeks, has had premature ejaculation associated with this as he is trying to hurry. He states that his confidence is low since the initial event and he believes this is contributing to the problem.   Unsure if this happens during masturbation as he has not attmepted.   No additional recent stressors, however he does have stress at work.     Had vasectomy 13 years ago.   No issues with urination. No hematuria or dysuria.   Testosterone: 778 (10/16/20)    Last urine culture: no documented UTIs    Lab Results   Component Value Date    CREATININE 1.1 08/01/2022     Family  hx: [email or web address]    Past medical, family, and social history reviewed as documented in chart with pertinent positive medical, family, and social history detailed in HPI.    Review of patient's allergies indicates:  No Known Allergies    Past Medical History:   Diagnosis Date    Syncope and collapse      Past Surgical History:   Procedure Laterality Date    ADENOIDECTOMY      CIRCUMCISION, PRIMARY      SOFT TISSUE CYST EXCISION      Epidermal Inclusion Cyst    Tonsilectomy      Age 5 or 6    TONSILLECTOMY      VASECTOMY       Family History   Problem Relation Age of Onset    Diabetes Father     Cancer Maternal Grandmother         Breast cancer  "    Social History     Tobacco Use    Smoking status: Never    Smokeless tobacco: Never   Substance Use Topics    Alcohol use: Yes     Comment: occasional    Drug use: No        Review of Systems   Constitutional:  Negative for chills and fever.   HENT:  Negative for trouble swallowing.    Eyes:  Negative for pain.   Respiratory:  Negative for cough and shortness of breath.    Cardiovascular:  Negative for chest pain and palpitations.   Gastrointestinal:  Negative for abdominal pain.   Genitourinary:  Negative for difficulty urinating, flank pain, hematuria, nocturia, penile pain, testicular pain and urgency.   Neurological:  Negative for weakness.   Psychiatric/Behavioral:  Negative for behavioral problems.        OBJECTIVE:     Anticoagulation: no    Estimated body mass index is 27.64 kg/m² as calculated from the following:    Height as of this encounter: 6' 3" (1.905 m).    Weight as of this encounter: 100.3 kg (221 lb 1.9 oz).    Vital Signs (Most Recent)      Physical Exam  Constitutional:       Appearance: He is not ill-appearing, toxic-appearing or diaphoretic.   HENT:      Head: Normocephalic and atraumatic.   Eyes:      General: No scleral icterus.  Pulmonary:      Effort: Pulmonary effort is normal. No respiratory distress.   Genitourinary:     Penis: Normal and circumcised.       Testes:         Right: Mass, tenderness or swelling not present.         Left: Mass, tenderness or swelling not present.      Comments: Prostate 35 g, benign, no nodules   Neurological:      General: No focal deficit present.      Mental Status: He is oriented to person, place, and time.   Psychiatric:         Mood and Affect: Mood normal.         Behavior: Behavior normal.         Thought Content: Thought content normal.         BMP  Lab Results   Component Value Date     08/01/2022    K 4.3 08/01/2022     08/01/2022    CO2 28 08/01/2022    BUN 12 08/01/2022    CREATININE 1.1 08/01/2022    CALCIUM 9.4 08/01/2022    " ANIONGAP 8 08/01/2022    ESTGFRAFRICA >60.0 01/18/2022    EGFRNONAA >60.0 01/18/2022       Lab Results   Component Value Date    WBC 3.45 (L) 08/01/2022    HGB 13.4 (L) 08/01/2022    HCT 42.5 08/01/2022    MCV 75 (L) 08/01/2022     08/01/2022       Imaging:  No pertinent recent imaging available.    ASSESSMENT     1. Erectile dysfunction, unspecified erectile dysfunction type    2. Prostate cancer screening      PLAN:     - Doing well, no voiding issues   - Cialis is working well, refill give 20 mg.   - Will check PSA now  - If doing well can follow up in 1 year     Willow Miller MD

## 2023-10-09 NOTE — PROGRESS NOTES
Two person identification name, d.o.b with verbal feedback.  Aseptic technique used. Administration influenza vaccine on R deltoid.  Tolerated well.  VIS 8/6/21  given/mp

## 2023-10-18 NOTE — PROGRESS NOTES
[facility] Note    Chief Complaint      Chief Complaint   Patient presents with    Annual Exam     History of Present Illness      Tre Sommer Sr. is a 43 y.o. male patient with chronic conditions of ED who presents today for annual.  Comes appointment alone  Safety-wear seatbelt    Labs ordered  Eye exams- utd  Colonoscopy    Vaccine:  COVID- x 2  Tdap-2017  Flu shot-10/2023    New problem:  Mom diet of Colungia cancer- gallbladder. Pt would like genetic testing for this    Health Maintenance   Topic Date Due    TETANUS VACCINE  07/03/2027    Lipid Panel  10/19/2028    Hepatitis C Screening  Completed       Past Medical History:   Diagnosis Date    Syncope and collapse        Past Surgical History:   Procedure Laterality Date    ADENOIDECTOMY      CIRCUMCISION, PRIMARY      SOFT TISSUE CYST EXCISION      Epidermal Inclusion Cyst    Tonsilectomy      Age 5 or 6    TONSILLECTOMY      VASECTOMY         family history includes Cancer in his maternal grandmother and mother; Diabetes in his father.     Social History     Tobacco Use    Smoking status: Never    Smokeless tobacco: Never   Substance Use Topics    Alcohol use: Yes     Alcohol/week: 3.0 standard drinks of alcohol     Types: 3 Glasses of wine per week     Comment: occasional    Drug use: No       Review of Systems   Constitutional:  Negative for chills and fever.   HENT:  Negative for congestion, sinus pain and sore throat.    Eyes:  Negative for blurred vision.   Respiratory:  Negative for cough, shortness of breath and wheezing.    Cardiovascular:  Negative for chest pain, palpitations and leg swelling.   Gastrointestinal:  Negative for abdominal pain, constipation, diarrhea, nausea and vomiting.   Genitourinary:  Negative for dysuria.   Musculoskeletal:  Negative for myalgias.   Skin:  Negative for rash.   Neurological:  Negative for dizziness, weakness and headaches.   Psychiatric/Behavioral:  Negative for depression. The patient is not  nervous/anxious.         Outpatient Encounter Medications as of 10/19/2023   Medication Sig Note Dispense Refill    multivitamin with minerals tablet Take 1 tablet by mouth once daily. 10/7/2015: Hold am of surgery      tadalafiL (CIALIS) 20 MG Tab Take 1 tablet (20 mg total) by mouth once daily.  30 tablet 5     No facility-administered encounter medications on file as of 10/19/2023.       Review of patient's allergies indicates:  No Known Allergies    Physical Exam      Vital Signs  Pulse: 62  Resp: 16  SpO2: 98 %  BP: 124/70  BP Location: Right arm  Patient Position: Sitting  Height and Weight  Weight: 99.3 kg (218 lb 14.7 oz)    Physical Exam  Vitals and nursing note reviewed.   Constitutional:       General: He is not in acute distress.     Appearance: Normal appearance. He is well-developed. He is not ill-appearing.   HENT:      Head: Normocephalic and atraumatic.      Right Ear: External ear normal.      Left Ear: External ear normal.   Eyes:      Conjunctiva/sclera: Conjunctivae normal.      Pupils: Pupils are equal, round, and reactive to light.   Neck:      Thyroid: No thyromegaly.      Vascular: No JVD.      Trachea: No tracheal deviation.   Cardiovascular:      Rate and Rhythm: Normal rate and regular rhythm.      Heart sounds: Normal heart sounds.   Pulmonary:      Effort: Pulmonary effort is normal. No respiratory distress.      Breath sounds: Normal breath sounds.   Abdominal:      General: Bowel sounds are normal. There is no distension.      Palpations: Abdomen is soft.      Tenderness: There is no abdominal tenderness.   Musculoskeletal:         General: Normal range of motion.      Cervical back: Normal range of motion and neck supple.   Lymphadenopathy:      Cervical: No cervical adenopathy.   Skin:     General: Skin is warm and dry.      Coloration: Skin is not pale.      Findings: No erythema or rash.   Neurological:      General: No focal deficit present.      Mental Status: He is alert and  oriented to person, place, and time.   Psychiatric:         Mood and Affect: Mood normal.         Behavior: Behavior normal.         Thought Content: Thought content normal.         Judgment: Judgment normal.          Laboratory:  CBC:  Lab Results   Component Value Date    WBC 4.04 10/19/2023    RBC 6.01 10/19/2023    HGB 14.2 10/19/2023    HCT 46.9 10/19/2023     10/19/2023    MCV 78 (L) 10/19/2023    MCH 23.6 (L) 10/19/2023    MCHC 30.3 (L) 10/19/2023    MCHC 31.5 (L) 08/01/2022    MCHC 31.0 (L) 01/18/2022     CMP:  Lab Results   Component Value Date    GLU 94 10/19/2023    CALCIUM 9.8 10/19/2023    ALBUMIN 4.6 10/19/2023    PROT 7.7 10/19/2023     10/19/2023    K 4.4 10/19/2023    CO2 26 10/19/2023     10/19/2023    BUN 9 10/19/2023    ALKPHOS 49 (L) 10/19/2023    ALT 25 10/19/2023    AST 23 10/19/2023    BILITOT 0.6 10/19/2023    BILITOT 0.6 08/01/2022    BILITOT 0.9 01/18/2022     URINALYSIS:  Lab Results   Component Value Date    COLORU Straw 10/19/2023    SPECGRAV 1.005 10/19/2023    PHUR >8.0 (A) 10/19/2023    PROTEINUA Negative 10/19/2023    NITRITE Negative 10/19/2023    LEUKOCYTESUR Negative 10/19/2023    UROBILINOGEN Negative 08/13/2016      LIPIDS:  Lab Results   Component Value Date    TSH 1.577 10/19/2023    TSH 1.953 08/01/2022    TSH 1.540 01/18/2022    HDL 79 (H) 10/19/2023    HDL 77 (H) 08/01/2022    HDL 83 (H) 01/18/2022    CHOL 197 10/19/2023    CHOL 197 08/01/2022    CHOL 194 01/18/2022    TRIG 74 10/19/2023    TRIG 70 08/01/2022    TRIG 53 01/18/2022    LDLCALC 103.2 10/19/2023    LDLCALC 106.0 08/01/2022    LDLCALC 100.4 01/18/2022    CHOLHDL 40.1 10/19/2023    CHOLHDL 39.1 08/01/2022    CHOLHDL 42.8 01/18/2022    NONHDLCHOL 118 10/19/2023    NONHDLCHOL 120 08/01/2022    NONHDLCHOL 111 01/18/2022    TOTALCHOLEST 2.5 10/19/2023    TOTALCHOLEST 2.6 08/01/2022    TOTALCHOLEST 2.3 01/18/2022     TSH:  Lab Results   Component Value Date    TSH 1.577 10/19/2023    TSH 1.953  08/01/2022    TSH 1.540 01/18/2022     A1C:  Lab Results   Component Value Date    HGBA1C 5.5 10/19/2023    HGBA1C 5.6 08/01/2022    HGBA1C 5.4 01/18/2022    HGBA1C 5.4 10/16/2020    HGBA1C 5.4 11/11/2019    HGBA1C 5.6 07/03/2017    HGBA1C 5.5 04/01/2015    HGBA1C 5.6 07/01/2013         Assessment/Plan     Tre Sommer Sr. is a 43 y.o.male with:    Annual physical exam  -     CBC Auto Differential; Future; Expected date: 10/19/2023  -     Comprehensive Metabolic Panel; Future; Expected date: 10/19/2023  -     Lipid Panel; Future; Expected date: 10/19/2023  -     TSH; Future; Expected date: 10/19/2023  -     Hemoglobin A1C; Future; Expected date: 10/19/2023  -     Vitamin D; Future; Expected date: 10/19/2023  -     Iron and TIBC; Future; Expected date: 10/19/2023  -     Ferritin; Future; Expected date: 10/19/2023  -     TESTOSTERONE; Future; Expected date: 10/19/2023  -     C. trachomatis/N. gonorrhoeae by AMP DNA [facility]; Urine  -     RPR; Future; Expected date: 10/19/2023  -     HIV 1/2 Ag/Ab (4th Gen); Future; Expected date: 10/19/2023  -     Herpes simplex type 1&2 IgG,Herpes titer; Future; Expected date: 10/19/2023  -     Herpes simplex type 1 & 2 IgM,Herpes IgM; Future; Expected date: 10/19/2023  -     Hepatitis panel, acute; Future; Expected date: 10/19/2023  -     Urinalysis; Future; Expected date: 10/19/2023    Iron deficiency  -     CBC Auto Differential; Future; Expected date: 10/19/2023  -     Comprehensive Metabolic Panel; Future; Expected date: 10/19/2023  -     Lipid Panel; Future; Expected date: 10/19/2023  -     TSH; Future; Expected date: 10/19/2023  -     Hemoglobin A1C; Future; Expected date: 10/19/2023  -     Vitamin D; Future; Expected date: 10/19/2023  -     Iron and TIBC; Future; Expected date: 10/19/2023  -     Ferritin; Future; Expected date: 10/19/2023  -     TESTOSTERONE; Future; Expected date: 10/19/2023  -     C. trachomatis/N. gonorrhoeae by AMP DNA [facility]; Urine  -     RPR; Future;  Expected date: 10/19/2023  -     HIV 1/2 Ag/Ab (4th Gen); Future; Expected date: 10/19/2023  -     Herpes simplex type 1&2 IgG,Herpes titer; Future; Expected date: 10/19/2023  -     Herpes simplex type 1 & 2 IgM,Herpes IgM; Future; Expected date: 10/19/2023  -     Hepatitis panel, acute; Future; Expected date: 10/19/2023  -     Urinalysis; Future; Expected date: 10/19/2023    Fatigue, unspecified type  -     CBC Auto Differential; Future; Expected date: 10/19/2023  -     Comprehensive Metabolic Panel; Future; Expected date: 10/19/2023  -     Lipid Panel; Future; Expected date: 10/19/2023  -     TSH; Future; Expected date: 10/19/2023  -     Hemoglobin A1C; Future; Expected date: 10/19/2023  -     Vitamin D; Future; Expected date: 10/19/2023  -     Iron and TIBC; Future; Expected date: 10/19/2023  -     Ferritin; Future; Expected date: 10/19/2023  -     TESTOSTERONE; Future; Expected date: 10/19/2023  -     C. trachomatis/N. gonorrhoeae by AMP DNA [facility]; Urine  -     RPR; Future; Expected date: 10/19/2023  -     HIV 1/2 Ag/Ab (4th Gen); Future; Expected date: 10/19/2023  -     Herpes simplex type 1&2 IgG,Herpes titer; Future; Expected date: 10/19/2023  -     Herpes simplex type 1 & 2 IgM,Herpes IgM; Future; Expected date: 10/19/2023  -     Hepatitis panel, acute; Future; Expected date: 10/19/2023  -     Urinalysis; Future; Expected date: 10/19/2023    Vitamin D deficiency  -     CBC Auto Differential; Future; Expected date: 10/19/2023  -     Comprehensive Metabolic Panel; Future; Expected date: 10/19/2023  -     Lipid Panel; Future; Expected date: 10/19/2023  -     TSH; Future; Expected date: 10/19/2023  -     Hemoglobin A1C; Future; Expected date: 10/19/2023  -     Vitamin D; Future; Expected date: 10/19/2023  -     Iron and TIBC; Future; Expected date: 10/19/2023  -     Ferritin; Future; Expected date: 10/19/2023  -     TESTOSTERONE; Future; Expected date: 10/19/2023  -     C. trachomatis/N. gonorrhoeae by AMP DNA  [facility]; Urine  -     RPR; Future; Expected date: 10/19/2023  -     HIV 1/2 Ag/Ab (4th Gen); Future; Expected date: 10/19/2023  -     Herpes simplex type 1&2 IgG,Herpes titer; Future; Expected date: 10/19/2023  -     Herpes simplex type 1 & 2 IgM,Herpes IgM; Future; Expected date: 10/19/2023  -     Hepatitis panel, acute; Future; Expected date: 10/19/2023  -     Urinalysis; Future; Expected date: 10/19/2023    Encounter for lipid screening for cardiovascular disease  -     CBC Auto Differential; Future; Expected date: 10/19/2023  -     Comprehensive Metabolic Panel; Future; Expected date: 10/19/2023  -     Lipid Panel; Future; Expected date: 10/19/2023  -     TSH; Future; Expected date: 10/19/2023  -     Hemoglobin A1C; Future; Expected date: 10/19/2023  -     Vitamin D; Future; Expected date: 10/19/2023  -     Iron and TIBC; Future; Expected date: 10/19/2023  -     Ferritin; Future; Expected date: 10/19/2023  -     TESTOSTERONE; Future; Expected date: 10/19/2023  -     C. trachomatis/N. gonorrhoeae by AMP DNA [facility]; Urine  -     RPR; Future; Expected date: 10/19/2023  -     HIV 1/2 Ag/Ab (4th Gen); Future; Expected date: 10/19/2023  -     Herpes simplex type 1&2 IgG,Herpes titer; Future; Expected date: 10/19/2023  -     Herpes simplex type 1 & 2 IgM,Herpes IgM; Future; Expected date: 10/19/2023  -     Hepatitis panel, acute; Future; Expected date: 10/19/2023  -     Urinalysis; Future; Expected date: 10/19/2023    Screen for STD (sexually transmitted disease)  -     CBC Auto Differential; Future; Expected date: 10/19/2023  -     Comprehensive Metabolic Panel; Future; Expected date: 10/19/2023  -     Lipid Panel; Future; Expected date: 10/19/2023  -     TSH; Future; Expected date: 10/19/2023  -     Hemoglobin A1C; Future; Expected date: 10/19/2023  -     Vitamin D; Future; Expected date: 10/19/2023  -     Iron and TIBC; Future; Expected date: 10/19/2023  -     Ferritin; Future; Expected date: 10/19/2023  -      TESTOSTERONE; Future; Expected date: 10/19/2023  -     C. trachomatis/N. gonorrhoeae by AMP DNA [facility]; Urine  -     RPR; Future; Expected date: 10/19/2023  -     HIV 1/2 Ag/Ab (4th Gen); Future; Expected date: 10/19/2023  -     Herpes simplex type 1&2 IgG,Herpes titer; Future; Expected date: 10/19/2023  -     Herpes simplex type 1 & 2 IgM,Herpes IgM; Future; Expected date: 10/19/2023  -     Hepatitis panel, acute; Future; Expected date: 10/19/2023  -     Urinalysis; Future; Expected date: 10/19/2023    Screening for diabetes mellitus  -     CBC Auto Differential; Future; Expected date: 10/19/2023  -     Comprehensive Metabolic Panel; Future; Expected date: 10/19/2023  -     Lipid Panel; Future; Expected date: 10/19/2023  -     TSH; Future; Expected date: 10/19/2023  -     Hemoglobin A1C; Future; Expected date: 10/19/2023  -     Vitamin D; Future; Expected date: 10/19/2023  -     Iron and TIBC; Future; Expected date: 10/19/2023  -     Ferritin; Future; Expected date: 10/19/2023  -     TESTOSTERONE; Future; Expected date: 10/19/2023  -     C. trachomatis/N. gonorrhoeae by AMP DNA [facility]; Urine  -     RPR; Future; Expected date: 10/19/2023  -     HIV 1/2 Ag/Ab (4th Gen); Future; Expected date: 10/19/2023  -     Herpes simplex type 1&2 IgG,Herpes titer; Future; Expected date: 10/19/2023  -     Herpes simplex type 1 & 2 IgM,Herpes IgM; Future; Expected date: 10/19/2023  -     Hepatitis panel, acute; Future; Expected date: 10/19/2023  -     Urinalysis; Future; Expected date: 10/19/2023         Health Maintenance Due   Topic Date Due    COVID-19 Vaccine (1) Never done          I spent 38 minutes on the day of this encounter for preparing for, evaluating, treating, and managing this patient.        -Continue current medications and maintain follow up with specialists.  Return to clinic in 1 year sooner for any concerns.  No follow-ups on file.      Erin Jiminez, NP-C [facility]

## 2023-10-19 ENCOUNTER — LAB VISIT (OUTPATIENT)
Dept: LAB | Facility: HOSPITAL | Age: 43
End: 2023-10-19
Attending: NURSE PRACTITIONER
Payer: COMMERCIAL

## 2023-10-19 ENCOUNTER — OFFICE VISIT (OUTPATIENT)
Dept: PRIMARY CARE CLINIC | Facility: CLINIC | Age: 43
End: 2023-10-19
Payer: COMMERCIAL

## 2023-10-19 VITALS
SYSTOLIC BLOOD PRESSURE: 124 MMHG | RESPIRATION RATE: 16 BRPM | WEIGHT: 218.94 LBS | HEART RATE: 62 BPM | DIASTOLIC BLOOD PRESSURE: 70 MMHG | BODY MASS INDEX: 27.36 KG/M2 | OXYGEN SATURATION: 98 %

## 2023-10-19 DIAGNOSIS — R53.83 FATIGUE, UNSPECIFIED TYPE: ICD-10-CM

## 2023-10-19 DIAGNOSIS — Z13.1 SCREENING FOR DIABETES MELLITUS: ICD-10-CM

## 2023-10-19 DIAGNOSIS — Z13.220 ENCOUNTER FOR LIPID SCREENING FOR CARDIOVASCULAR DISEASE: ICD-10-CM

## 2023-10-19 DIAGNOSIS — E61.1 IRON DEFICIENCY: ICD-10-CM

## 2023-10-19 DIAGNOSIS — Z00.00 ANNUAL PHYSICAL EXAM: Primary | ICD-10-CM

## 2023-10-19 DIAGNOSIS — E55.9 VITAMIN D DEFICIENCY: ICD-10-CM

## 2023-10-19 DIAGNOSIS — Z13.6 ENCOUNTER FOR LIPID SCREENING FOR CARDIOVASCULAR DISEASE: ICD-10-CM

## 2023-10-19 DIAGNOSIS — Z00.00 ANNUAL PHYSICAL EXAM: ICD-10-CM

## 2023-10-19 DIAGNOSIS — Z11.3 SCREEN FOR STD (SEXUALLY TRANSMITTED DISEASE): ICD-10-CM

## 2023-10-19 LAB
25(OH)D3+25(OH)D2 SERPL-MCNC: 23 NG/ML (ref 30–96)
ALBUMIN SERPL BCP-MCNC: 4.6 G/DL (ref 3.5–5.2)
ALP SERPL-CCNC: 49 U/L (ref 55–135)
ALT SERPL W/O P-5'-P-CCNC: 25 U/L (ref 10–44)
ANION GAP SERPL CALC-SCNC: 10 MMOL/L (ref 8–16)
AST SERPL-CCNC: 23 U/L (ref 10–40)
BASOPHILS # BLD AUTO: 0.06 K/UL (ref 0–0.2)
BASOPHILS NFR BLD: 1.5 % (ref 0–1.9)
BILIRUB SERPL-MCNC: 0.6 MG/DL (ref 0.1–1)
BUN SERPL-MCNC: 9 MG/DL (ref 6–20)
CALCIUM SERPL-MCNC: 9.8 MG/DL (ref 8.7–10.5)
CHLORIDE SERPL-SCNC: 102 MMOL/L (ref 95–110)
CHOLEST SERPL-MCNC: 197 MG/DL (ref 120–199)
CHOLEST/HDLC SERPL: 2.5 {RATIO} (ref 2–5)
CO2 SERPL-SCNC: 26 MMOL/L (ref 23–29)
CREAT SERPL-MCNC: 1 MG/DL (ref 0.5–1.4)
DIFFERENTIAL METHOD: ABNORMAL
EOSINOPHIL # BLD AUTO: 0.1 K/UL (ref 0–0.5)
EOSINOPHIL NFR BLD: 3 % (ref 0–8)
ERYTHROCYTE [DISTWIDTH] IN BLOOD BY AUTOMATED COUNT: 14.8 % (ref 11.5–14.5)
EST. GFR  (NO RACE VARIABLE): >60 ML/MIN/1.73 M^2
ESTIMATED AVG GLUCOSE: 111 MG/DL (ref 68–131)
FERRITIN SERPL-MCNC: 100 NG/ML (ref 20–300)
GLUCOSE SERPL-MCNC: 94 MG/DL (ref 70–110)
HAV IGM SERPL QL IA: NORMAL
HBA1C MFR BLD: 5.5 % (ref 4–5.6)
HBV CORE IGM SERPL QL IA: NORMAL
HBV SURFACE AG SERPL QL IA: NORMAL
HCT VFR BLD AUTO: 46.9 % (ref 40–54)
HCV AB SERPL QL IA: NORMAL
HDLC SERPL-MCNC: 79 MG/DL (ref 40–75)
HDLC SERPL: 40.1 % (ref 20–50)
HGB BLD-MCNC: 14.2 G/DL (ref 14–18)
HIV 1+2 AB+HIV1 P24 AG SERPL QL IA: NORMAL
IMM GRANULOCYTES # BLD AUTO: 0 K/UL (ref 0–0.04)
IMM GRANULOCYTES NFR BLD AUTO: 0 % (ref 0–0.5)
IRON SERPL-MCNC: 145 UG/DL (ref 45–160)
LDLC SERPL CALC-MCNC: 103.2 MG/DL (ref 63–159)
LYMPHOCYTES # BLD AUTO: 1.1 K/UL (ref 1–4.8)
LYMPHOCYTES NFR BLD: 28 % (ref 18–48)
MCH RBC QN AUTO: 23.6 PG (ref 27–31)
MCHC RBC AUTO-ENTMCNC: 30.3 G/DL (ref 32–36)
MCV RBC AUTO: 78 FL (ref 82–98)
MONOCYTES # BLD AUTO: 0.5 K/UL (ref 0.3–1)
MONOCYTES NFR BLD: 11.6 % (ref 4–15)
NEUTROPHILS # BLD AUTO: 2.3 K/UL (ref 1.8–7.7)
NEUTROPHILS NFR BLD: 55.9 % (ref 38–73)
NONHDLC SERPL-MCNC: 118 MG/DL
NRBC BLD-RTO: 0 /100 WBC
PLATELET # BLD AUTO: 301 K/UL (ref 150–450)
PMV BLD AUTO: 10.6 FL (ref 9.2–12.9)
POTASSIUM SERPL-SCNC: 4.4 MMOL/L (ref 3.5–5.1)
PROT SERPL-MCNC: 7.7 G/DL (ref 6–8.4)
RBC # BLD AUTO: 6.01 M/UL (ref 4.6–6.2)
SATURATED IRON: 35 % (ref 20–50)
SODIUM SERPL-SCNC: 138 MMOL/L (ref 136–145)
TESTOST SERPL-MCNC: 669 NG/DL (ref 304–1227)
TOTAL IRON BINDING CAPACITY: 414 UG/DL (ref 250–450)
TRANSFERRIN SERPL-MCNC: 280 MG/DL (ref 200–375)
TRIGL SERPL-MCNC: 74 MG/DL (ref 30–150)
TSH SERPL DL<=0.005 MIU/L-ACNC: 1.58 UIU/ML (ref 0.4–4)
WBC # BLD AUTO: 4.04 K/UL (ref 3.9–12.7)

## 2023-10-19 PROCEDURE — 3074F PR MOST RECENT SYSTOLIC BLOOD PRESSURE < 130 MM HG: ICD-10-PCS | Mod: CPTII,S$GLB,, | Performed by: NURSE PRACTITIONER

## 2023-10-19 PROCEDURE — 86592 SYPHILIS TEST NON-TREP QUAL: CPT | Performed by: NURSE PRACTITIONER

## 2023-10-19 PROCEDURE — 82306 VITAMIN D 25 HYDROXY: CPT | Performed by: NURSE PRACTITIONER

## 2023-10-19 PROCEDURE — 86694 HERPES SIMPLEX NES ANTBDY: CPT | Performed by: NURSE PRACTITIONER

## 2023-10-19 PROCEDURE — 84403 ASSAY OF TOTAL TESTOSTERONE: CPT | Performed by: NURSE PRACTITIONER

## 2023-10-19 PROCEDURE — 83540 ASSAY OF IRON: CPT | Performed by: NURSE PRACTITIONER

## 2023-10-19 PROCEDURE — 80053 COMPREHEN METABOLIC PANEL: CPT | Performed by: NURSE PRACTITIONER

## 2023-10-19 PROCEDURE — 86696 HERPES SIMPLEX TYPE 2 TEST: CPT | Performed by: NURSE PRACTITIONER

## 2023-10-19 PROCEDURE — 36415 COLL VENOUS BLD VENIPUNCTURE: CPT | Performed by: NURSE PRACTITIONER

## 2023-10-19 PROCEDURE — 80061 LIPID PANEL: CPT | Performed by: NURSE PRACTITIONER

## 2023-10-19 PROCEDURE — 1160F PR REVIEW ALL MEDS BY PRESCRIBER/CLIN PHARMACIST DOCUMENTED: ICD-10-PCS | Mod: CPTII,S$GLB,, | Performed by: NURSE PRACTITIONER

## 2023-10-19 PROCEDURE — 83036 HEMOGLOBIN GLYCOSYLATED A1C: CPT | Performed by: NURSE PRACTITIONER

## 2023-10-19 PROCEDURE — 99396 PR PREVENTIVE VISIT,EST,40-64: ICD-10-PCS | Mod: S$GLB,,, | Performed by: NURSE PRACTITIONER

## 2023-10-19 PROCEDURE — 3078F DIAST BP <80 MM HG: CPT | Mod: CPTII,S$GLB,, | Performed by: NURSE PRACTITIONER

## 2023-10-19 PROCEDURE — 82728 ASSAY OF FERRITIN: CPT | Performed by: NURSE PRACTITIONER

## 2023-10-19 PROCEDURE — 3008F PR BODY MASS INDEX (BMI) DOCUMENTED: ICD-10-PCS | Mod: CPTII,S$GLB,, | Performed by: NURSE PRACTITIONER

## 2023-10-19 PROCEDURE — 84466 ASSAY OF TRANSFERRIN: CPT | Performed by: NURSE PRACTITIONER

## 2023-10-19 PROCEDURE — 80074 ACUTE HEPATITIS PANEL: CPT | Performed by: NURSE PRACTITIONER

## 2023-10-19 PROCEDURE — 3044F PR MOST RECENT HEMOGLOBIN A1C LEVEL <7.0%: ICD-10-PCS | Mod: CPTII,S$GLB,, | Performed by: NURSE PRACTITIONER

## 2023-10-19 PROCEDURE — 3074F SYST BP LT 130 MM HG: CPT | Mod: CPTII,S$GLB,, | Performed by: NURSE PRACTITIONER

## 2023-10-19 PROCEDURE — 84443 ASSAY THYROID STIM HORMONE: CPT | Performed by: NURSE PRACTITIONER

## 2023-10-19 PROCEDURE — 3044F HG A1C LEVEL LT 7.0%: CPT | Mod: CPTII,S$GLB,, | Performed by: NURSE PRACTITIONER

## 2023-10-19 PROCEDURE — 3078F PR MOST RECENT DIASTOLIC BLOOD PRESSURE < 80 MM HG: ICD-10-PCS | Mod: CPTII,S$GLB,, | Performed by: NURSE PRACTITIONER

## 2023-10-19 PROCEDURE — 1159F PR MEDICATION LIST DOCUMENTED IN MEDICAL RECORD: ICD-10-PCS | Mod: CPTII,S$GLB,, | Performed by: NURSE PRACTITIONER

## 2023-10-19 PROCEDURE — 87389 HIV-1 AG W/HIV-1&-2 AB AG IA: CPT | Performed by: NURSE PRACTITIONER

## 2023-10-19 PROCEDURE — 85025 COMPLETE CBC W/AUTO DIFF WBC: CPT | Performed by: NURSE PRACTITIONER

## 2023-10-19 PROCEDURE — 99999 PR PBB SHADOW E&M-EST. PATIENT-LVL III: CPT | Mod: PBBFAC,,, | Performed by: NURSE PRACTITIONER

## 2023-10-19 PROCEDURE — 99999 PR PBB SHADOW E&M-EST. PATIENT-LVL III: ICD-10-PCS | Mod: PBBFAC,,, | Performed by: NURSE PRACTITIONER

## 2023-10-19 PROCEDURE — 99396 PREV VISIT EST AGE 40-64: CPT | Mod: S$GLB,,, | Performed by: NURSE PRACTITIONER

## 2023-10-19 PROCEDURE — 3008F BODY MASS INDEX DOCD: CPT | Mod: CPTII,S$GLB,, | Performed by: NURSE PRACTITIONER

## 2023-10-19 PROCEDURE — 1160F RVW MEDS BY RX/DR IN RCRD: CPT | Mod: CPTII,S$GLB,, | Performed by: NURSE PRACTITIONER

## 2023-10-19 PROCEDURE — 1159F MED LIST DOCD IN RCRD: CPT | Mod: CPTII,S$GLB,, | Performed by: NURSE PRACTITIONER

## 2023-10-20 ENCOUNTER — PATIENT MESSAGE (OUTPATIENT)
Dept: PRIMARY CARE CLINIC | Facility: CLINIC | Age: 43
End: 2023-10-20
Payer: COMMERCIAL

## 2023-10-20 LAB
HSV1 IGG SERPL QL IA: POSITIVE
HSV2 IGG SERPL QL IA: POSITIVE
RPR SER QL: NORMAL

## 2023-10-25 LAB — HSV AB, IGM BY EIA: 1.06 INDEX

## 2023-11-06 ENCOUNTER — PATIENT MESSAGE (OUTPATIENT)
Dept: PRIMARY CARE CLINIC | Facility: CLINIC | Age: 43
End: 2023-11-06
Payer: COMMERCIAL

## 2023-11-06 DIAGNOSIS — Z80.0 FAMILY HISTORY OF CANCER OF GALLBLADDER: Primary | ICD-10-CM

## 2024-05-27 DIAGNOSIS — N52.9 ERECTILE DYSFUNCTION, UNSPECIFIED ERECTILE DYSFUNCTION TYPE: ICD-10-CM

## 2024-05-27 RX ORDER — TADALAFIL 20 MG/1
20 TABLET ORAL DAILY
Qty: 30 TABLET | Refills: 5 | Status: SHIPPED | OUTPATIENT
Start: 2024-05-27 | End: 2025-05-27

## 2024-06-29 ENCOUNTER — OFFICE VISIT (OUTPATIENT)
Dept: URGENT CARE | Facility: CLINIC | Age: 44
End: 2024-06-29
Payer: COMMERCIAL

## 2024-06-29 VITALS
TEMPERATURE: 98 F | WEIGHT: 218 LBS | SYSTOLIC BLOOD PRESSURE: 115 MMHG | HEIGHT: 75 IN | RESPIRATION RATE: 16 BRPM | HEART RATE: 67 BPM | BODY MASS INDEX: 27.1 KG/M2 | DIASTOLIC BLOOD PRESSURE: 75 MMHG | OXYGEN SATURATION: 96 %

## 2024-06-29 DIAGNOSIS — R09.81 NASAL CONGESTION: Primary | ICD-10-CM

## 2024-06-29 DIAGNOSIS — J32.9 SINUSITIS, UNSPECIFIED CHRONICITY, UNSPECIFIED LOCATION: ICD-10-CM

## 2024-06-29 PROCEDURE — 99213 OFFICE O/P EST LOW 20 MIN: CPT | Mod: S$GLB,,, | Performed by: NURSE PRACTITIONER

## 2024-06-29 RX ORDER — LEVOCETIRIZINE DIHYDROCHLORIDE 5 MG/1
5 TABLET, FILM COATED ORAL NIGHTLY
Qty: 30 TABLET | Refills: 11 | Status: SHIPPED | OUTPATIENT
Start: 2024-06-29 | End: 2025-06-29

## 2024-06-29 RX ORDER — PREDNISONE 20 MG/1
20 TABLET ORAL DAILY
Qty: 5 TABLET | Refills: 0 | Status: SHIPPED | OUTPATIENT
Start: 2024-06-29 | End: 2024-07-04

## 2024-06-29 RX ORDER — AZELASTINE 1 MG/ML
1 SPRAY, METERED NASAL 2 TIMES DAILY
Qty: 30 ML | Refills: 0 | Status: SHIPPED | OUTPATIENT
Start: 2024-06-29 | End: 2025-06-29

## 2024-06-29 NOTE — PATIENT INSTRUCTIONS
You can try breathe right strips at night to help you breathe.  A cool mist humidifier in bedroom may help with cough and relieve stuffy nose.     Sore throat:  Lozenge, hard candy or honey.      Sinus rinses DO NOT USE TAP WATER, if you must, water must be a rolling boil for 1 minute, let it cool, then use.  May use distilled water, or over the counter nasal saline rinses.  Vics vapor rub in shower to help open nasal passages.  May use nasal gel to keep passages moisturized.  May use Nasal saline sprays during the day for added relief of congestion.   For those who go to the gym, please do not use the sauna or steam room now to clear sinuses.    During pollen season, change shirt if you are outside for a while when you go in.  Also wash your face.  Do not touch your face with your hands.  Wash your hands often in general while ill, avoid face contact with hands.     Over the counter you can use Tylenol (acetominophen) or Ibuprofen for your minor aches and pains as long as you have no contraindications.    Good nutrition. Lots of rest. Plenty of fluids.      You must understand that you've received an Urgent Care treatment only and that you may be released before all your medical problems are known or treated. You, the patient, will arrange for follow up care as instructed.  Follow up with your PCP or specialty clinic as directed in the next 1-2 weeks if not improved or as needed.  You can call [phone] to schedule an appointment with the appropriate provider.  If your condition worsens we recommend that you receive another evaluation at the emergency room immediately or contact your primary medical clinics after hours call service to discuss your concerns.  Please return here or go to the Emergency Department for any concerns or worsening of condition.    If you were prescribed a narcotic or controlled medication, do not drive or operate heavy equipment or machinery while taking these medications.    Thank  you for choosing [facility]!    Our goal in the Urgent Care is to always provide outstanding medical care. You may receive a survey by mail or e-mail in the next week regarding your experience today. We would greatly appreciate you completing and returning the survey. Your feedback provides us with a way to recognize our staff who provide very good care, and it helps us learn how to improve when your experience was below our aspiration of excellence.      We appreciate you trusting us with your medical care. We hope you feel better soon. We will be happy to take care of you for all of your future medical needs.   This note was prepared using voice-recognition software.  Although efforts are made to proofread the note, some errors may persist in the final document.     Sincerely,    Arnaud Silveira DNP, FNP-C

## 2024-06-29 NOTE — PROGRESS NOTES
"Subjective:      Patient ID: Tre Sommer Sr. is a 44 y.o. male.    Vitals:  height is 6' 3" (1.905 m) and weight is 98.9 kg (218 lb). His tympanic temperature is 97.8 °F (36.6 °C). His blood pressure is 115/75 and his pulse is 67. His respiration is 16 and oxygen saturation is 96%.     Chief Complaint: Sinus Problem    45 y/o male presents today with a complaint of sinus congestion, sinus pressure, and a post nasal drip.  Onset of symptoms, 3 days.  Reports taking Motrin and OTC Flu medication, no relief.  Refused Covid or Influenza testing today.     Sinus Problem  This is a new problem. The current episode started in the past 7 days (Thursday). The problem has been gradually worsening since onset. There has been no fever. He is experiencing no pain. Associated symptoms include congestion, coughing, diaphoresis (dry), headaches and sinus pressure. Pertinent negatives include no chills, ear pain, neck pain, shortness of breath, sore throat or swollen glands. (Low grade temp  Throat dry) Treatments tried: Motrin and OTC flu and cold. The treatment provided no relief.       Constitution: Positive for sweating (dry). Negative for chills, fatigue, fever, generalized weakness and international travel in last 60 days.   HENT:  Positive for congestion, postnasal drip and sinus pressure. Negative for ear pain, sinus pain, sore throat, trouble swallowing and voice change.         Ears popping   Neck: Negative for neck pain.   Cardiovascular:  Negative for chest pain and palpitations.   Respiratory:  Positive for cough. Negative for chest tightness, sputum production, COPD, shortness of breath, stridor, wheezing and asthma.    Skin:  Negative for rash.   Allergic/Immunologic: Negative for asthma.   Neurological:  Positive for headaches. Negative for dizziness and history of migraines.      Objective:     Physical Exam   Constitutional: He is oriented to person, place, and time. He appears well-developed. He is cooperative. "  Non-toxic appearance. He does not appear ill. No distress.   HENT:   Head: Normocephalic and atraumatic.   Ears:   Right Ear: Hearing, tympanic membrane, external ear and ear canal normal. no impacted cerumen  Left Ear: Hearing, tympanic membrane, external ear and ear canal normal. no impacted cerumen  Nose: Mucosal edema, rhinorrhea and congestion present. No sinus tenderness or nasal deformity. No epistaxis. Right sinus exhibits no maxillary sinus tenderness and no frontal sinus tenderness. Left sinus exhibits no maxillary sinus tenderness and no frontal sinus tenderness.   Mouth/Throat: Uvula is midline, oropharynx is clear and moist and mucous membranes are normal. No trismus in the jaw. Normal dentition. No uvula swelling. No oropharyngeal exudate, posterior oropharyngeal edema or posterior oropharyngeal erythema.   Eyes: Conjunctivae and lids are normal. No scleral icterus.   Neck: Trachea normal and phonation normal. Neck supple. No edema present. No erythema present. No neck rigidity present.   Cardiovascular: Normal rate, regular rhythm, normal heart sounds and normal pulses.   Pulmonary/Chest: Effort normal and breath sounds normal. No respiratory distress. He has no decreased breath sounds. He has no rhonchi.   Abdominal: Normal appearance.   Musculoskeletal: Normal range of motion.         General: No deformity. Normal range of motion.   Neurological: He is alert and oriented to person, place, and time. He exhibits normal muscle tone. Coordination normal.   Skin: Skin is warm, dry, intact, not diaphoretic and not pale.   Psychiatric: His speech is normal and behavior is normal. Judgment and thought content normal.   Nursing note and vitals reviewed.      Assessment:     1. Nasal congestion    2. Sinusitis, unspecified chronicity, unspecified location        Plan:       Nasal congestion  -     azelastine (ASTELIN) 137 mcg (0.1 %) nasal spray; 1 spray (137 mcg total) by Nasal route 2 (two) times daily.   Dispense: 30 mL; Refill: 0  -     levocetirizine (XYZAL) 5 MG tablet; Take 1 tablet (5 mg total) by mouth every evening.  Dispense: 30 tablet; Refill: 11  -     predniSONE (DELTASONE) 20 MG tablet; Take 1 tablet (20 mg total) by mouth once daily. for 5 days  Dispense: 5 tablet; Refill: 0    Sinusitis, unspecified chronicity, unspecified location  -     predniSONE (DELTASONE) 20 MG tablet; Take 1 tablet (20 mg total) by mouth once daily. for 5 days  Dispense: 5 tablet; Refill: 0      You can try breathe right strips at night to help you breathe.  A cool mist humidifier in bedroom may help with cough and relieve stuffy nose.     Sore throat:  Lozenge, hard candy or honey.      Sinus rinses DO NOT USE TAP WATER, if you must, water must be a rolling boil for 1 minute, let it cool, then use.  May use distilled water, or over the counter nasal saline rinses.  Vics vapor rub in shower to help open nasal passages.  May use nasal gel to keep passages moisturized.  May use Nasal saline sprays during the day for added relief of congestion.   For those who go to the gym, please do not use the sauna or steam room now to clear sinuses.    During pollen season, change shirt if you are outside for a while when you go in.  Also wash your face.  Do not touch your face with your hands.  Wash your hands often in general while ill, avoid face contact with hands.     Over the counter you can use Tylenol (acetominophen) or Ibuprofen for your minor aches and pains as long as you have no contraindications.    Good nutrition. Lots of rest. Plenty of fluids.      You must understand that you've received an Urgent Care treatment only and that you may be released before all your medical problems are known or treated. You, the patient, will arrange for follow up care as instructed.  Follow up with your PCP or specialty clinic as directed in the next 1-2 weeks if not improved or as needed.  You can call [phone] to schedule an appointment  with the appropriate provider.  If your condition worsens we recommend that you receive another evaluation at the emergency room immediately or contact your primary medical clinics after hours call service to discuss your concerns.  Please return here or go to the Emergency Department for any concerns or worsening of condition.    If you were prescribed a narcotic or controlled medication, do not drive or operate heavy equipment or machinery while taking these medications.    Thank you for choosing [facility]!         Additional MDM:     Heart Failure Score:   COPD = No

## 2024-12-20 ENCOUNTER — OFFICE VISIT (OUTPATIENT)
Dept: PRIMARY CARE CLINIC | Facility: CLINIC | Age: 44
End: 2024-12-20
Payer: COMMERCIAL

## 2024-12-20 VITALS
HEART RATE: 65 BPM | DIASTOLIC BLOOD PRESSURE: 60 MMHG | RESPIRATION RATE: 16 BRPM | BODY MASS INDEX: 27.52 KG/M2 | HEIGHT: 75 IN | OXYGEN SATURATION: 97 % | SYSTOLIC BLOOD PRESSURE: 110 MMHG | WEIGHT: 221.31 LBS

## 2024-12-20 DIAGNOSIS — Z00.00 ANNUAL PHYSICAL EXAM: Primary | ICD-10-CM

## 2024-12-20 DIAGNOSIS — Z13.6 ENCOUNTER FOR LIPID SCREENING FOR CARDIOVASCULAR DISEASE: ICD-10-CM

## 2024-12-20 DIAGNOSIS — Z13.1 SCREENING FOR DIABETES MELLITUS: ICD-10-CM

## 2024-12-20 DIAGNOSIS — Z13.21 ENCOUNTER FOR VITAMIN DEFICIENCY SCREENING: ICD-10-CM

## 2024-12-20 DIAGNOSIS — N52.9 ERECTILE DYSFUNCTION, UNSPECIFIED ERECTILE DYSFUNCTION TYPE: ICD-10-CM

## 2024-12-20 DIAGNOSIS — Z13.0 SCREENING FOR IRON DEFICIENCY ANEMIA: ICD-10-CM

## 2024-12-20 DIAGNOSIS — Z11.3 SCREEN FOR STD (SEXUALLY TRANSMITTED DISEASE): ICD-10-CM

## 2024-12-20 DIAGNOSIS — Z13.220 ENCOUNTER FOR LIPID SCREENING FOR CARDIOVASCULAR DISEASE: ICD-10-CM

## 2024-12-20 PROCEDURE — 99999 PR PBB SHADOW E&M-EST. PATIENT-LVL III: CPT | Mod: PBBFAC,,, | Performed by: NURSE PRACTITIONER

## 2024-12-20 RX ORDER — TADALAFIL 20 MG/1
20 TABLET ORAL DAILY
Qty: 30 TABLET | Refills: 5 | Status: SHIPPED | OUTPATIENT
Start: 2024-12-20 | End: 2025-12-20

## 2024-12-20 RX ORDER — TADALAFIL 20 MG/1
20 TABLET ORAL DAILY
Qty: 30 TABLET | Refills: 5 | Status: SHIPPED | OUTPATIENT
Start: 2024-12-20 | End: 2024-12-20

## 2024-12-20 NOTE — PROGRESS NOTES
[facility] Note    Chief Complaint      Chief Complaint   Patient presents with    Annual Exam     History of Present Illness      Tre Sommer Sr. is a 44 y.o. male patient who presents today for annual    Vaccines:  Flu today  Labs- ordered  Eye exams- needs        History of Present Illness    CHIEF COMPLAINT:  Tre presents today for follow-up.    FAMILY HISTORY:  Multiple relatives on mother's side have history of cancer. Father has elevated PSA levels and is being evaluated for prostate cancer with pending biopsy. Father also has history of congestive heart failure with recent combined defibrillator and pacemaker placement.    LIFESTYLE:  He exercises regularly, reporting it helps manage stress and improve mood.    MEDICATIONS:  He takes multivitamins and a holistic serum for digestion.    PREVENTIVE CARE:  His last eye exam was 1.5 years ago. He plans to complete comprehensive labs including iron, testosterone, STD screening (urinalysis, hepatitis panel, HIV, syphilis, chlamydia, and gonorrhea), but declines herpes testing due to concerns about test accuracy.      ROS:  Psychiatric: -mood swings         Answers submitted by the patient for this visit:  Review of Systems Questionnaire (Submitted on 12/20/2024)  activity change: No  unexpected weight change: No  neck pain: No  hearing loss: No  rhinorrhea: No  trouble swallowing: No  eye discharge: No  visual disturbance: No  chest tightness: No  wheezing: No  chest pain: No  palpitations: No  blood in stool: No  constipation: No  vomiting: No  diarrhea: No  polydipsia: No  polyuria: No  difficulty urinating: No  urgency: No  hematuria: No  joint swelling: No  arthralgias: No  headaches: No  weakness: No  confusion: No  dysphoric mood: No    Health Maintenance   Topic Date Due    Influenza Vaccine (1) 09/01/2024    COVID-19 Vaccine (3 - 2024-25 season) 09/01/2024    Hemoglobin A1c (Diabetic Prevention Screening)  10/19/2026    TETANUS  VACCINE  07/03/2027    Lipid Panel  10/19/2028    RSV Vaccine (Age 60+ and Pregnant patients) (1 - 1-dose 75+ series) 06/28/2056    Hepatitis C Screening  Completed    HIV Screening  Completed    Pneumococcal Vaccines (Age 0-64)  Aged Out       Past Medical History:   Diagnosis Date    Syncope and collapse        Past Surgical History:   Procedure Laterality Date    ADENOIDECTOMY      CIRCUMCISION, PRIMARY      SOFT TISSUE CYST EXCISION      Epidermal Inclusion Cyst    Tonsilectomy      Age 5 or 6    TONSILLECTOMY      VASECTOMY         family history includes Cancer in his maternal grandmother and mother; Diabetes in his father; Heart disease in his father.     Social History     Tobacco Use    Smoking status: Never    Smokeless tobacco: Never   Substance Use Topics    Alcohol use: Yes     Alcohol/week: 3.0 standard drinks of alcohol     Types: 3 Glasses of wine per week     Comment: occasional    Drug use: No       Outpatient Encounter Medications as of 12/20/2024   Medication Sig Note Dispense Refill    azelastine (ASTELIN) 137 mcg (0.1 %) nasal spray 1 spray (137 mcg total) by Nasal route 2 (two) times daily.  30 mL 0    levocetirizine (XYZAL) 5 MG tablet Take 1 tablet (5 mg total) by mouth every evening.  30 tablet 11    multivitamin with minerals tablet Take 1 tablet by mouth once daily. 10/7/2015: Hold am of surgery      [DISCONTINUED] tadalafiL (CIALIS) 20 MG Tab Take 1 tablet (20 mg total) by mouth once daily.  30 tablet 5    tadalafiL (CIALIS) 20 MG Tab Take 1 tablet (20 mg total) by mouth once daily.  30 tablet 5    [DISCONTINUED] tadalafiL (CIALIS) 20 MG Tab Take 1 tablet (20 mg total) by mouth once daily.  30 tablet 5     No facility-administered encounter medications on file as of 12/20/2024.       Review of patient's allergies indicates:  No Known Allergies    Physical Exam      Vital Signs  Pulse: 65  Resp: 16  SpO2: 97 %  BP: 110/60  BP Location: Right arm  Patient Position: Sitting  Height and  "Weight  Height: 6' 3" (190.5 cm)  Weight: 100.4 kg (221 lb 5.5 oz)  BSA (Calculated - sq m): 2.3 sq meters  BMI (Calculated): 27.7  Weight in (lb) to have BMI = 25: 199.6    Physical Exam  Vitals and nursing note reviewed.   Constitutional:       General: He is not in acute distress.     Appearance: Normal appearance. He is well-developed. He is not ill-appearing.   HENT:      Head: Normocephalic and atraumatic.      Right Ear: External ear normal.      Left Ear: External ear normal.   Eyes:      Conjunctiva/sclera: Conjunctivae normal.      Pupils: Pupils are equal, round, and reactive to light.   Neck:      Thyroid: No thyromegaly.      Vascular: No JVD.      Trachea: No tracheal deviation.   Cardiovascular:      Rate and Rhythm: Normal rate and regular rhythm.      Heart sounds: Normal heart sounds.   Pulmonary:      Effort: Pulmonary effort is normal. No respiratory distress.      Breath sounds: Normal breath sounds.   Abdominal:      General: Bowel sounds are normal. There is no distension.      Palpations: Abdomen is soft.      Tenderness: There is no abdominal tenderness.   Musculoskeletal:         General: Normal range of motion.      Cervical back: Normal range of motion and neck supple.   Lymphadenopathy:      Cervical: No cervical adenopathy.   Skin:     General: Skin is warm and dry.      Coloration: Skin is not pale.      Findings: No erythema or rash.   Neurological:      General: No focal deficit present.      Mental Status: He is alert and oriented to person, place, and time.   Psychiatric:         Mood and Affect: Mood normal.         Behavior: Behavior normal.         Thought Content: Thought content normal.         Judgment: Judgment normal.          Laboratory:  CBC:  Lab Results   Component Value Date    WBC 4.04 10/19/2023    RBC 6.01 10/19/2023    HGB 14.2 10/19/2023    HCT 46.9 10/19/2023     10/19/2023    MCV 78 (L) 10/19/2023    MCH 23.6 (L) 10/19/2023    MCHC 30.3 (L) 10/19/2023    " MCHC 31.5 (L) 08/01/2022    MCHC 31.0 (L) 01/18/2022     CMP:  Lab Results   Component Value Date    GLU 94 10/19/2023    CALCIUM 9.8 10/19/2023    ALBUMIN 4.6 10/19/2023    PROT 7.7 10/19/2023     10/19/2023    K 4.4 10/19/2023    CO2 26 10/19/2023     10/19/2023    BUN 9 10/19/2023    ALKPHOS 49 (L) 10/19/2023    ALT 25 10/19/2023    AST 23 10/19/2023    BILITOT 0.6 10/19/2023    BILITOT 0.6 08/01/2022    BILITOT 0.9 01/18/2022     URINALYSIS:  Lab Results   Component Value Date    COLORU Straw 10/19/2023    SPECGRAV 1.005 10/19/2023    PHUR >8.0 (A) 10/19/2023    PROTEINUA Negative 10/19/2023    NITRITE Negative 10/19/2023    LEUKOCYTESUR Negative 10/19/2023    UROBILINOGEN Negative 08/13/2016      LIPIDS:  Lab Results   Component Value Date    TSH 1.577 10/19/2023    TSH 1.953 08/01/2022    TSH 1.540 01/18/2022    HDL 79 (H) 10/19/2023    HDL 77 (H) 08/01/2022    HDL 83 (H) 01/18/2022    CHOL 197 10/19/2023    CHOL 197 08/01/2022    CHOL 194 01/18/2022    TRIG 74 10/19/2023    TRIG 70 08/01/2022    TRIG 53 01/18/2022    LDLCALC 103.2 10/19/2023    LDLCALC 106.0 08/01/2022    LDLCALC 100.4 01/18/2022    CHOLHDL 40.1 10/19/2023    CHOLHDL 39.1 08/01/2022    CHOLHDL 42.8 01/18/2022    NONHDLCHOL 118 10/19/2023    NONHDLCHOL 120 08/01/2022    NONHDLCHOL 111 01/18/2022    TOTALCHOLEST 2.5 10/19/2023    TOTALCHOLEST 2.6 08/01/2022    TOTALCHOLEST 2.3 01/18/2022     TSH:  Lab Results   Component Value Date    TSH 1.577 10/19/2023    TSH 1.953 08/01/2022    TSH 1.540 01/18/2022     A1C:  Lab Results   Component Value Date    HGBA1C 5.5 10/19/2023    HGBA1C 5.6 08/01/2022    HGBA1C 5.4 01/18/2022    HGBA1C 5.4 10/16/2020    HGBA1C 5.4 11/11/2019    HGBA1C 5.6 07/03/2017    HGBA1C 5.5 04/01/2015    HGBA1C 5.6 07/01/2013         Assessment/Plan     Tre Sommer Sr. is a 44 y.o.male with:    Assessment & Plan    IMPRESSION:  - Assessed need for genetic testing related to family history of cancer  - Noted  patient's father's recent cardiac issues and prostate cancer concerns    GENERAL ADULT MEDICAL EXAMINATION:    - Durund to continue current exercise routine, noting its positive impact on stress management and overall well-being.  - Durund to schedule eye exam, as it has been approximately 1.5 years since last exam.  - Continue multivitamin.    - Ordered labs: iron, testosterone, STD panel (excluding herpes), urinalysis, hepatitis panel, HIV, syphilis, chlamydia, gonorrhea.    IMMUNIZATION:  - Flu shot today.    SCREENING FOR CARDIOVASCULAR DISORDERS AND OTHER CONDITIONS:  - Ordered labs: iron, testosterone, STD panel (excluding herpes), urinalysis, hepatitis panel, HIV, syphilis, chlamydia, gonorrhea.          Annual physical exam  -     Iron and TIBC; Future; Expected date: 12/20/2024  -     Ferritin; Future; Expected date: 12/20/2024  -     CBC Auto Differential; Future; Expected date: 12/20/2024  -     Comprehensive Metabolic Panel; Future; Expected date: 12/20/2024  -     Hemoglobin A1C; Future; Expected date: 12/20/2024  -     TSH; Future; Expected date: 12/20/2024  -     Lipid Panel; Future; Expected date: 12/20/2024  -     Vitamin D; Future; Expected date: 12/20/2024  -     C. trachomatis/N. gonorrhoeae by AMP DNA [facility]; Urine; Future; Expected date: 12/20/2024  -     RPR; Future; Expected date: 12/20/2024  -     Treponema Pallidium Antibodies IgG, IgM; Future; Expected date: 12/20/2024  -     HIV 1/2 Ag/Ab (4th Gen); Future; Expected date: 12/20/2024  -     Hepatitis panel, acute; Future; Expected date: 12/20/2024    Erectile dysfunction, unspecified erectile dysfunction type  -     Iron and TIBC; Future; Expected date: 12/20/2024  -     Ferritin; Future; Expected date: 12/20/2024  -     CBC Auto Differential; Future; Expected date: 12/20/2024  -     Comprehensive Metabolic Panel; Future; Expected date: 12/20/2024  -     Hemoglobin A1C; Future; Expected date: 12/20/2024  -     TSH; Future; Expected date:  12/20/2024  -     Lipid Panel; Future; Expected date: 12/20/2024  -     Vitamin D; Future; Expected date: 12/20/2024  -     C. trachomatis/N. gonorrhoeae by AMP DNA [facility]; Urine; Future; Expected date: 12/20/2024  -     RPR; Future; Expected date: 12/20/2024  -     Treponema Pallidium Antibodies IgG, IgM; Future; Expected date: 12/20/2024  -     HIV 1/2 Ag/Ab (4th Gen); Future; Expected date: 12/20/2024  -     Hepatitis panel, acute; Future; Expected date: 12/20/2024  -     Discontinue: tadalafiL (CIALIS) 20 MG Tab; Take 1 tablet (20 mg total) by mouth once daily.  Dispense: 30 tablet; Refill: 5  -     tadalafiL (CIALIS) 20 MG Tab; Take 1 tablet (20 mg total) by mouth once daily.  Dispense: 30 tablet; Refill: 5    Screen for STD (sexually transmitted disease)  -     Iron and TIBC; Future; Expected date: 12/20/2024  -     Ferritin; Future; Expected date: 12/20/2024  -     CBC Auto Differential; Future; Expected date: 12/20/2024  -     Comprehensive Metabolic Panel; Future; Expected date: 12/20/2024  -     Hemoglobin A1C; Future; Expected date: 12/20/2024  -     TSH; Future; Expected date: 12/20/2024  -     Lipid Panel; Future; Expected date: 12/20/2024  -     Vitamin D; Future; Expected date: 12/20/2024  -     C. trachomatis/N. gonorrhoeae by AMP DNA [facility]; Urine; Future; Expected date: 12/20/2024  -     RPR; Future; Expected date: 12/20/2024  -     Treponema Pallidium Antibodies IgG, IgM; Future; Expected date: 12/20/2024  -     HIV 1/2 Ag/Ab (4th Gen); Future; Expected date: 12/20/2024  -     Hepatitis panel, acute; Future; Expected date: 12/20/2024  -     tadalafiL (CIALIS) 20 MG Tab; Take 1 tablet (20 mg total) by mouth once daily.  Dispense: 30 tablet; Refill: 5    Encounter for vitamin deficiency screening  -     Iron and TIBC; Future; Expected date: 12/20/2024  -     Ferritin; Future; Expected date: 12/20/2024  -     CBC Auto Differential; Future; Expected date: 12/20/2024  -     Comprehensive Metabolic  Panel; Future; Expected date: 12/20/2024  -     Hemoglobin A1C; Future; Expected date: 12/20/2024  -     TSH; Future; Expected date: 12/20/2024  -     Lipid Panel; Future; Expected date: 12/20/2024  -     Vitamin D; Future; Expected date: 12/20/2024  -     C. trachomatis/N. gonorrhoeae by AMP DNA [facility]; Urine; Future; Expected date: 12/20/2024  -     RPR; Future; Expected date: 12/20/2024  -     Treponema Pallidium Antibodies IgG, IgM; Future; Expected date: 12/20/2024  -     HIV 1/2 Ag/Ab (4th Gen); Future; Expected date: 12/20/2024  -     Hepatitis panel, acute; Future; Expected date: 12/20/2024  -     tadalafiL (CIALIS) 20 MG Tab; Take 1 tablet (20 mg total) by mouth once daily.  Dispense: 30 tablet; Refill: 5    Screening for diabetes mellitus  -     Iron and TIBC; Future; Expected date: 12/20/2024  -     Ferritin; Future; Expected date: 12/20/2024  -     CBC Auto Differential; Future; Expected date: 12/20/2024  -     Comprehensive Metabolic Panel; Future; Expected date: 12/20/2024  -     Hemoglobin A1C; Future; Expected date: 12/20/2024  -     TSH; Future; Expected date: 12/20/2024  -     Lipid Panel; Future; Expected date: 12/20/2024  -     Vitamin D; Future; Expected date: 12/20/2024  -     C. trachomatis/N. gonorrhoeae by AMP DNA [facility]; Urine; Future; Expected date: 12/20/2024  -     RPR; Future; Expected date: 12/20/2024  -     Treponema Pallidium Antibodies IgG, IgM; Future; Expected date: 12/20/2024  -     HIV 1/2 Ag/Ab (4th Gen); Future; Expected date: 12/20/2024  -     Hepatitis panel, acute; Future; Expected date: 12/20/2024  -     tadalafiL (CIALIS) 20 MG Tab; Take 1 tablet (20 mg total) by mouth once daily.  Dispense: 30 tablet; Refill: 5    Encounter for lipid screening for cardiovascular disease  -     Iron and TIBC; Future; Expected date: 12/20/2024  -     Ferritin; Future; Expected date: 12/20/2024  -     CBC Auto Differential; Future; Expected date: 12/20/2024  -     Comprehensive Metabolic  Panel; Future; Expected date: 12/20/2024  -     Hemoglobin A1C; Future; Expected date: 12/20/2024  -     TSH; Future; Expected date: 12/20/2024  -     Lipid Panel; Future; Expected date: 12/20/2024  -     Vitamin D; Future; Expected date: 12/20/2024  -     C. trachomatis/N. gonorrhoeae by AMP DNA [facility]; Urine; Future; Expected date: 12/20/2024  -     RPR; Future; Expected date: 12/20/2024  -     Treponema Pallidium Antibodies IgG, IgM; Future; Expected date: 12/20/2024  -     HIV 1/2 Ag/Ab (4th Gen); Future; Expected date: 12/20/2024  -     Hepatitis panel, acute; Future; Expected date: 12/20/2024  -     tadalafiL (CIALIS) 20 MG Tab; Take 1 tablet (20 mg total) by mouth once daily.  Dispense: 30 tablet; Refill: 5    Screening for iron deficiency anemia  -     Iron and TIBC; Future; Expected date: 12/20/2024  -     Ferritin; Future; Expected date: 12/20/2024  -     CBC Auto Differential; Future; Expected date: 12/20/2024  -     Comprehensive Metabolic Panel; Future; Expected date: 12/20/2024  -     Hemoglobin A1C; Future; Expected date: 12/20/2024  -     TSH; Future; Expected date: 12/20/2024  -     Lipid Panel; Future; Expected date: 12/20/2024  -     Vitamin D; Future; Expected date: 12/20/2024  -     C. trachomatis/N. gonorrhoeae by AMP DNA [facility]; Urine; Future; Expected date: 12/20/2024  -     RPR; Future; Expected date: 12/20/2024  -     Treponema Pallidium Antibodies IgG, IgM; Future; Expected date: 12/20/2024  -     HIV 1/2 Ag/Ab (4th Gen); Future; Expected date: 12/20/2024  -     Hepatitis panel, acute; Future; Expected date: 12/20/2024  -     tadalafiL (CIALIS) 20 MG Tab; Take 1 tablet (20 mg total) by mouth once daily.  Dispense: 30 tablet; Refill: 5         Health Maintenance Due   Topic Date Due    Influenza Vaccine (1) 09/01/2024    COVID-19 Vaccine (3 - 2024-25 season) 09/01/2024          I spent 36 minutes on the day of this encounter for preparing for, evaluating, treating, and managing this  patient.        -Continue current medications and maintain follow up with specialists.  Return to clinic in 1 year sooner for any concerns No follow-ups on file.     This note was generated with the assistance of ambient listening technology. Verbal consent was obtained by the patient and accompanying visitor(s) for the recording of patient appointment to facilitate this note. I attest to having reviewed and edited the generated note for accuracy, though some syntax or spelling errors may persist. Please contact the author of this note for any clarification.         JESUS MANUEL LangfordC  [facility]

## 2024-12-31 ENCOUNTER — LAB VISIT (OUTPATIENT)
Dept: LAB | Facility: HOSPITAL | Age: 44
End: 2024-12-31
Attending: NURSE PRACTITIONER
Payer: COMMERCIAL

## 2024-12-31 DIAGNOSIS — Z13.1 SCREENING FOR DIABETES MELLITUS: ICD-10-CM

## 2024-12-31 DIAGNOSIS — Z13.0 SCREENING FOR IRON DEFICIENCY ANEMIA: ICD-10-CM

## 2024-12-31 DIAGNOSIS — Z11.3 SCREEN FOR STD (SEXUALLY TRANSMITTED DISEASE): ICD-10-CM

## 2024-12-31 DIAGNOSIS — N52.9 ERECTILE DYSFUNCTION, UNSPECIFIED ERECTILE DYSFUNCTION TYPE: ICD-10-CM

## 2024-12-31 DIAGNOSIS — Z13.21 ENCOUNTER FOR VITAMIN DEFICIENCY SCREENING: ICD-10-CM

## 2024-12-31 DIAGNOSIS — Z13.220 ENCOUNTER FOR LIPID SCREENING FOR CARDIOVASCULAR DISEASE: ICD-10-CM

## 2024-12-31 DIAGNOSIS — Z13.6 ENCOUNTER FOR LIPID SCREENING FOR CARDIOVASCULAR DISEASE: ICD-10-CM

## 2024-12-31 DIAGNOSIS — Z00.00 ANNUAL PHYSICAL EXAM: ICD-10-CM

## 2024-12-31 LAB
25(OH)D3+25(OH)D2 SERPL-MCNC: 25 NG/ML (ref 30–96)
ALBUMIN SERPL BCP-MCNC: 4.6 G/DL (ref 3.5–5.2)
ALP SERPL-CCNC: 47 U/L (ref 40–150)
ALT SERPL W/O P-5'-P-CCNC: 30 U/L (ref 10–44)
ANION GAP SERPL CALC-SCNC: 9 MMOL/L (ref 8–16)
AST SERPL-CCNC: 27 U/L (ref 10–40)
BASOPHILS # BLD AUTO: 0.08 K/UL (ref 0–0.2)
BASOPHILS NFR BLD: 1.7 % (ref 0–1.9)
BILIRUB SERPL-MCNC: 0.7 MG/DL (ref 0.1–1)
BUN SERPL-MCNC: 13 MG/DL (ref 6–20)
CALCIUM SERPL-MCNC: 9.7 MG/DL (ref 8.7–10.5)
CHLORIDE SERPL-SCNC: 101 MMOL/L (ref 95–110)
CHOLEST SERPL-MCNC: 226 MG/DL (ref 120–199)
CHOLEST/HDLC SERPL: 2.6 {RATIO} (ref 2–5)
CO2 SERPL-SCNC: 27 MMOL/L (ref 23–29)
CREAT SERPL-MCNC: 1.3 MG/DL (ref 0.5–1.4)
DIFFERENTIAL METHOD BLD: ABNORMAL
EOSINOPHIL # BLD AUTO: 0.1 K/UL (ref 0–0.5)
EOSINOPHIL NFR BLD: 2.1 % (ref 0–8)
ERYTHROCYTE [DISTWIDTH] IN BLOOD BY AUTOMATED COUNT: 14.7 % (ref 11.5–14.5)
EST. GFR  (NO RACE VARIABLE): >60 ML/MIN/1.73 M^2
ESTIMATED AVG GLUCOSE: 108 MG/DL (ref 68–131)
FERRITIN SERPL-MCNC: 138 NG/ML (ref 20–300)
GLUCOSE SERPL-MCNC: 95 MG/DL (ref 70–110)
HAV IGM SERPL QL IA: NORMAL
HBA1C MFR BLD: 5.4 % (ref 4–5.6)
HBV CORE IGM SERPL QL IA: NORMAL
HBV SURFACE AG SERPL QL IA: NORMAL
HCT VFR BLD AUTO: 45.1 % (ref 40–54)
HCV AB SERPL QL IA: NORMAL
HDLC SERPL-MCNC: 87 MG/DL (ref 40–75)
HDLC SERPL: 38.5 % (ref 20–50)
HGB BLD-MCNC: 13.8 G/DL (ref 14–18)
HIV 1+2 AB+HIV1 P24 AG SERPL QL IA: NORMAL
IMM GRANULOCYTES # BLD AUTO: 0.01 K/UL (ref 0–0.04)
IMM GRANULOCYTES NFR BLD AUTO: 0.2 % (ref 0–0.5)
IRON SERPL-MCNC: 145 UG/DL (ref 45–160)
LDLC SERPL CALC-MCNC: 122.4 MG/DL (ref 63–159)
LYMPHOCYTES # BLD AUTO: 1.2 K/UL (ref 1–4.8)
LYMPHOCYTES NFR BLD: 26.3 % (ref 18–48)
MCH RBC QN AUTO: 23.8 PG (ref 27–31)
MCHC RBC AUTO-ENTMCNC: 30.6 G/DL (ref 32–36)
MCV RBC AUTO: 78 FL (ref 82–98)
MONOCYTES # BLD AUTO: 0.6 K/UL (ref 0.3–1)
MONOCYTES NFR BLD: 13.3 % (ref 4–15)
NEUTROPHILS # BLD AUTO: 2.7 K/UL (ref 1.8–7.7)
NEUTROPHILS NFR BLD: 56.4 % (ref 38–73)
NONHDLC SERPL-MCNC: 139 MG/DL
NRBC BLD-RTO: 0 /100 WBC
PLATELET # BLD AUTO: 318 K/UL (ref 150–450)
PMV BLD AUTO: 10.3 FL (ref 9.2–12.9)
POTASSIUM SERPL-SCNC: 4.7 MMOL/L (ref 3.5–5.1)
PROT SERPL-MCNC: 8.1 G/DL (ref 6–8.4)
RBC # BLD AUTO: 5.81 M/UL (ref 4.6–6.2)
SATURATED IRON: 32 % (ref 20–50)
SODIUM SERPL-SCNC: 137 MMOL/L (ref 136–145)
TOTAL IRON BINDING CAPACITY: 447 UG/DL (ref 250–450)
TRANSFERRIN SERPL-MCNC: 302 MG/DL (ref 200–375)
TREPONEMA PALLIDUM IGG+IGM AB [PRESENCE] IN SERUM OR PLASMA BY IMMUNOASSAY: NONREACTIVE
TRIGL SERPL-MCNC: 83 MG/DL (ref 30–150)
TSH SERPL DL<=0.005 MIU/L-ACNC: 1.82 UIU/ML (ref 0.4–4)
WBC # BLD AUTO: 4.72 K/UL (ref 3.9–12.7)

## 2024-12-31 PROCEDURE — 80053 COMPREHEN METABOLIC PANEL: CPT | Performed by: NURSE PRACTITIONER

## 2024-12-31 PROCEDURE — 80061 LIPID PANEL: CPT | Performed by: NURSE PRACTITIONER

## 2024-12-31 PROCEDURE — 84466 ASSAY OF TRANSFERRIN: CPT | Performed by: NURSE PRACTITIONER

## 2024-12-31 PROCEDURE — 86593 SYPHILIS TEST NON-TREP QUANT: CPT | Performed by: NURSE PRACTITIONER

## 2024-12-31 PROCEDURE — 87389 HIV-1 AG W/HIV-1&-2 AB AG IA: CPT | Performed by: NURSE PRACTITIONER

## 2024-12-31 PROCEDURE — 80074 ACUTE HEPATITIS PANEL: CPT | Performed by: NURSE PRACTITIONER

## 2024-12-31 PROCEDURE — 84443 ASSAY THYROID STIM HORMONE: CPT | Performed by: NURSE PRACTITIONER

## 2024-12-31 PROCEDURE — 36415 COLL VENOUS BLD VENIPUNCTURE: CPT | Performed by: NURSE PRACTITIONER

## 2024-12-31 PROCEDURE — 82306 VITAMIN D 25 HYDROXY: CPT | Performed by: NURSE PRACTITIONER

## 2024-12-31 PROCEDURE — 85025 COMPLETE CBC W/AUTO DIFF WBC: CPT | Performed by: NURSE PRACTITIONER

## 2024-12-31 PROCEDURE — 83036 HEMOGLOBIN GLYCOSYLATED A1C: CPT | Performed by: NURSE PRACTITIONER

## 2024-12-31 PROCEDURE — 82728 ASSAY OF FERRITIN: CPT | Performed by: NURSE PRACTITIONER

## 2025-02-17 ENCOUNTER — PATIENT MESSAGE (OUTPATIENT)
Dept: PRIMARY CARE CLINIC | Facility: CLINIC | Age: 45
End: 2025-02-17
Payer: COMMERCIAL

## 2025-02-17 DIAGNOSIS — E55.9 VITAMIN D DEFICIENCY: Primary | ICD-10-CM

## 2025-02-17 DIAGNOSIS — Z80.42 FAMILY HISTORY OF PROSTATE CANCER: ICD-10-CM

## 2025-02-17 DIAGNOSIS — Z12.5 SPECIAL SCREENING FOR MALIGNANT NEOPLASM OF PROSTATE: ICD-10-CM

## 2025-02-17 RX ORDER — ERGOCALCIFEROL 1.25 MG/1
50000 CAPSULE ORAL
Qty: 12 CAPSULE | Refills: 0 | Status: SHIPPED | OUTPATIENT
Start: 2025-02-17

## 2025-02-17 NOTE — TELEPHONE ENCOUNTER
LOV with Dana Gomez NP , 12/20/2024  Your result note from 12/31/24 states you will send in a rx of vit d to take once weekly. I do not show a prescription was sent to pharmacy. I pended this for you.   Pt states that his father was recently diagnosed with prostate cancer. His provider rec that patient start getting PSAs with his annual labs. Was also interested in genetic testing.

## 2025-04-07 ENCOUNTER — LAB VISIT (OUTPATIENT)
Dept: LAB | Facility: HOSPITAL | Age: 45
End: 2025-04-07
Attending: NURSE PRACTITIONER
Payer: COMMERCIAL

## 2025-04-07 DIAGNOSIS — Z12.5 SPECIAL SCREENING FOR MALIGNANT NEOPLASM OF PROSTATE: ICD-10-CM

## 2025-04-07 DIAGNOSIS — Z80.42 FAMILY HISTORY OF PROSTATE CANCER: ICD-10-CM

## 2025-04-07 LAB — PSA SERPL-MCNC: 0.32 NG/ML

## 2025-04-07 PROCEDURE — 36415 COLL VENOUS BLD VENIPUNCTURE: CPT

## 2025-04-07 PROCEDURE — 84153 ASSAY OF PSA TOTAL: CPT

## 2025-04-14 ENCOUNTER — RESULTS FOLLOW-UP (OUTPATIENT)
Dept: PRIMARY CARE CLINIC | Facility: CLINIC | Age: 45
End: 2025-04-14